# Patient Record
Sex: MALE | Race: WHITE | Employment: OTHER | URBAN - METROPOLITAN AREA
[De-identification: names, ages, dates, MRNs, and addresses within clinical notes are randomized per-mention and may not be internally consistent; named-entity substitution may affect disease eponyms.]

---

## 2019-08-04 ENCOUNTER — APPOINTMENT (OUTPATIENT)
Dept: CT IMAGING | Age: 24
End: 2019-08-04

## 2019-08-04 ENCOUNTER — HOSPITAL ENCOUNTER (EMERGENCY)
Age: 24
Discharge: HOME | End: 2019-08-04
Attending: EMERGENCY MEDICINE

## 2019-08-04 VITALS
DIASTOLIC BLOOD PRESSURE: 103 MMHG | HEART RATE: 96 BPM | TEMPERATURE: 98.7 F | RESPIRATION RATE: 20 BRPM | WEIGHT: 300 LBS | HEIGHT: 72 IN | OXYGEN SATURATION: 99 % | SYSTOLIC BLOOD PRESSURE: 168 MMHG | BODY MASS INDEX: 40.63 KG/M2

## 2019-08-04 DIAGNOSIS — S09.90XA INJURY OF HEAD, INITIAL ENCOUNTER: Primary | ICD-10-CM

## 2019-08-04 PROCEDURE — 6370000000 HC RX 637 (ALT 250 FOR IP): Performed by: STUDENT IN AN ORGANIZED HEALTH CARE EDUCATION/TRAINING PROGRAM

## 2019-08-04 PROCEDURE — 99284 EMERGENCY DEPT VISIT MOD MDM: CPT

## 2019-08-04 PROCEDURE — 70450 CT HEAD/BRAIN W/O DYE: CPT

## 2019-08-04 RX ORDER — ACETAMINOPHEN 500 MG
1000 TABLET ORAL ONCE
Status: COMPLETED | OUTPATIENT
Start: 2019-08-04 | End: 2019-08-04

## 2019-08-04 RX ADMIN — ACETAMINOPHEN 1000 MG: 500 TABLET ORAL at 14:25

## 2019-08-04 ASSESSMENT — PAIN DESCRIPTION - FREQUENCY: FREQUENCY: CONTINUOUS

## 2019-08-04 ASSESSMENT — PAIN SCALES - GENERAL
PAINLEVEL_OUTOF10: 7
PAINLEVEL_OUTOF10: 7

## 2019-08-04 ASSESSMENT — PAIN DESCRIPTION - LOCATION: LOCATION: HEAD

## 2019-08-04 ASSESSMENT — PAIN DESCRIPTION - DESCRIPTORS: DESCRIPTORS: SHARP;THROBBING

## 2019-08-04 ASSESSMENT — ENCOUNTER SYMPTOMS
NAUSEA: 0
VOMITING: 0
SHORTNESS OF BREATH: 0
ABDOMINAL PAIN: 0

## 2020-12-08 ENCOUNTER — OFFICE VISIT (OUTPATIENT)
Dept: PRIMARY CARE CLINIC | Age: 25
End: 2020-12-08

## 2020-12-08 PROCEDURE — 99211 OFF/OP EST MAY X REQ PHY/QHP: CPT | Performed by: NURSE PRACTITIONER

## 2020-12-08 NOTE — PROGRESS NOTES
Jass Santamaria received a viral test for COVID-19. They were educated on isolation and quarantine as appropriate. For any symptoms, they were directed to seek care from their PCP, given contact information to establish with a doctor, directed to an urgent care or the emergency room.

## 2020-12-10 LAB — SARS-COV-2, NAA: NOT DETECTED

## 2021-02-17 ENCOUNTER — APPOINTMENT (OUTPATIENT)
Dept: CT IMAGING | Age: 26
End: 2021-02-17
Payer: COMMERCIAL

## 2021-02-17 ENCOUNTER — APPOINTMENT (OUTPATIENT)
Dept: GENERAL RADIOLOGY | Age: 26
End: 2021-02-17
Payer: COMMERCIAL

## 2021-02-17 ENCOUNTER — HOSPITAL ENCOUNTER (OUTPATIENT)
Age: 26
Setting detail: OBSERVATION
Discharge: HOME | End: 2021-02-18
Attending: INTERNAL MEDICINE | Admitting: INTERNAL MEDICINE
Payer: COMMERCIAL

## 2021-02-17 DIAGNOSIS — R06.02 SHORTNESS OF BREATH: ICD-10-CM

## 2021-02-17 DIAGNOSIS — R42 DIZZINESS: ICD-10-CM

## 2021-02-17 DIAGNOSIS — I48.91 NEW ONSET A-FIB (HCC): Primary | ICD-10-CM

## 2021-02-17 DIAGNOSIS — R94.31 T WAVE INVERSION IN EKG: ICD-10-CM

## 2021-02-17 LAB
ANION GAP SERPL CALCULATED.3IONS-SCNC: 11 MMOL/L (ref 3–16)
APTT: 33.1 SEC (ref 24.2–36.2)
BASOPHILS ABSOLUTE: 0.1 K/UL (ref 0–0.2)
BASOPHILS RELATIVE PERCENT: 1.1 %
BUN BLDV-MCNC: 8 MG/DL (ref 7–20)
CALCIUM SERPL-MCNC: 9.1 MG/DL (ref 8.3–10.6)
CHLORIDE BLD-SCNC: 104 MMOL/L (ref 99–110)
CO2: 26 MMOL/L (ref 21–32)
CREAT SERPL-MCNC: 1 MG/DL (ref 0.9–1.3)
D DIMER: <200 NG/ML DDU (ref 0–229)
EOSINOPHILS ABSOLUTE: 1.4 K/UL (ref 0–0.6)
EOSINOPHILS RELATIVE PERCENT: 13.5 %
GFR AFRICAN AMERICAN: >60
GFR NON-AFRICAN AMERICAN: >60
GLUCOSE BLD-MCNC: 156 MG/DL (ref 70–99)
GLUCOSE BLD-MCNC: 94 MG/DL (ref 70–99)
HCT VFR BLD CALC: 48 % (ref 40.5–52.5)
HEMOGLOBIN: 16.6 G/DL (ref 13.5–17.5)
INR BLD: 0.98 (ref 0.86–1.14)
LYMPHOCYTES ABSOLUTE: 3.7 K/UL (ref 1–5.1)
LYMPHOCYTES RELATIVE PERCENT: 35.9 %
MCH RBC QN AUTO: 31.1 PG (ref 26–34)
MCHC RBC AUTO-ENTMCNC: 34.6 G/DL (ref 31–36)
MCV RBC AUTO: 89.8 FL (ref 80–100)
MONOCYTES ABSOLUTE: 0.5 K/UL (ref 0–1.3)
MONOCYTES RELATIVE PERCENT: 5 %
NEUTROPHILS ABSOLUTE: 4.5 K/UL (ref 1.7–7.7)
NEUTROPHILS RELATIVE PERCENT: 44.5 %
PDW BLD-RTO: 13.3 % (ref 12.4–15.4)
PERFORMED ON: NORMAL
PLATELET # BLD: 291 K/UL (ref 135–450)
PMV BLD AUTO: 7.8 FL (ref 5–10.5)
POTASSIUM REFLEX MAGNESIUM: 3.6 MMOL/L (ref 3.5–5.1)
PRO-BNP: 1813 PG/ML (ref 0–124)
PROTHROMBIN TIME: 11.4 SEC (ref 10–13.2)
RBC # BLD: 5.35 M/UL (ref 4.2–5.9)
SODIUM BLD-SCNC: 141 MMOL/L (ref 136–145)
TROPONIN: <0.01 NG/ML
TSH REFLEX: 3 UIU/ML (ref 0.27–4.2)
WBC # BLD: 10.2 K/UL (ref 4–11)

## 2021-02-17 PROCEDURE — 2500000003 HC RX 250 WO HCPCS: Performed by: NURSE PRACTITIONER

## 2021-02-17 PROCEDURE — 71046 X-RAY EXAM CHEST 2 VIEWS: CPT

## 2021-02-17 PROCEDURE — 96374 THER/PROPH/DIAG INJ IV PUSH: CPT

## 2021-02-17 PROCEDURE — 85610 PROTHROMBIN TIME: CPT

## 2021-02-17 PROCEDURE — 84484 ASSAY OF TROPONIN QUANT: CPT

## 2021-02-17 PROCEDURE — 83880 ASSAY OF NATRIURETIC PEPTIDE: CPT

## 2021-02-17 PROCEDURE — 71045 X-RAY EXAM CHEST 1 VIEW: CPT

## 2021-02-17 PROCEDURE — 80048 BASIC METABOLIC PNL TOTAL CA: CPT

## 2021-02-17 PROCEDURE — 84443 ASSAY THYROID STIM HORMONE: CPT

## 2021-02-17 PROCEDURE — 85025 COMPLETE CBC W/AUTO DIFF WBC: CPT

## 2021-02-17 PROCEDURE — G0378 HOSPITAL OBSERVATION PER HR: HCPCS

## 2021-02-17 PROCEDURE — 85379 FIBRIN DEGRADATION QUANT: CPT

## 2021-02-17 PROCEDURE — 99284 EMERGENCY DEPT VISIT MOD MDM: CPT

## 2021-02-17 PROCEDURE — 6370000000 HC RX 637 (ALT 250 FOR IP): Performed by: NURSE PRACTITIONER

## 2021-02-17 PROCEDURE — 70450 CT HEAD/BRAIN W/O DYE: CPT

## 2021-02-17 PROCEDURE — 85730 THROMBOPLASTIN TIME PARTIAL: CPT

## 2021-02-17 PROCEDURE — 36415 COLL VENOUS BLD VENIPUNCTURE: CPT

## 2021-02-17 PROCEDURE — 93005 ELECTROCARDIOGRAM TRACING: CPT | Performed by: NURSE PRACTITIONER

## 2021-02-17 PROCEDURE — 2580000003 HC RX 258: Performed by: NURSE PRACTITIONER

## 2021-02-17 RX ORDER — MECLIZINE HCL 12.5 MG/1
25 TABLET ORAL ONCE
Status: DISCONTINUED | OUTPATIENT
Start: 2021-02-17 | End: 2021-02-17

## 2021-02-17 RX ORDER — 0.9 % SODIUM CHLORIDE 0.9 %
1000 INTRAVENOUS SOLUTION INTRAVENOUS ONCE
Status: COMPLETED | OUTPATIENT
Start: 2021-02-17 | End: 2021-02-18

## 2021-02-17 RX ORDER — MECLIZINE HCL 12.5 MG/1
25 TABLET ORAL ONCE
Status: COMPLETED | OUTPATIENT
Start: 2021-02-17 | End: 2021-02-17

## 2021-02-17 RX ORDER — DILTIAZEM HYDROCHLORIDE 5 MG/ML
10 INJECTION INTRAVENOUS ONCE
Status: COMPLETED | OUTPATIENT
Start: 2021-02-17 | End: 2021-02-17

## 2021-02-17 RX ADMIN — SODIUM CHLORIDE 1000 ML: 9 INJECTION, SOLUTION INTRAVENOUS at 22:56

## 2021-02-17 RX ADMIN — DILTIAZEM HYDROCHLORIDE 5 MG/HR: 5 INJECTION INTRAVENOUS at 22:56

## 2021-02-17 RX ADMIN — DILTIAZEM HYDROCHLORIDE 10 MG: 5 INJECTION INTRAVENOUS at 22:54

## 2021-02-17 RX ADMIN — MECLIZINE 25 MG: 12.5 TABLET ORAL at 22:56

## 2021-02-18 ENCOUNTER — APPOINTMENT (OUTPATIENT)
Dept: CARDIAC CATH/INVASIVE PROCEDURES | Age: 26
End: 2021-02-18
Payer: COMMERCIAL

## 2021-02-18 VITALS
DIASTOLIC BLOOD PRESSURE: 83 MMHG | BODY MASS INDEX: 38.08 KG/M2 | SYSTOLIC BLOOD PRESSURE: 131 MMHG | OXYGEN SATURATION: 99 % | RESPIRATION RATE: 14 BRPM | WEIGHT: 296.74 LBS | HEIGHT: 74 IN | HEART RATE: 91 BPM | TEMPERATURE: 98.3 F

## 2021-02-18 PROBLEM — I48.91 NEW ONSET A-FIB (HCC): Status: RESOLVED | Noted: 2021-02-17 | Resolved: 2021-02-18

## 2021-02-18 LAB
AMPHETAMINE SCREEN, URINE: NORMAL
ANION GAP SERPL CALCULATED.3IONS-SCNC: 11 MMOL/L (ref 3–16)
BARBITURATE SCREEN URINE: NORMAL
BENZODIAZEPINE SCREEN, URINE: NORMAL
BILIRUBIN URINE: NEGATIVE
BLOOD, URINE: NEGATIVE
BUN BLDV-MCNC: 9 MG/DL (ref 7–20)
CALCIUM SERPL-MCNC: 8.7 MG/DL (ref 8.3–10.6)
CANNABINOID SCREEN URINE: NORMAL
CHLORIDE BLD-SCNC: 106 MMOL/L (ref 99–110)
CLARITY: CLEAR
CO2: 23 MMOL/L (ref 21–32)
COCAINE METABOLITE SCREEN URINE: NORMAL
COLOR: YELLOW
CREAT SERPL-MCNC: 1 MG/DL (ref 0.9–1.3)
EPITHELIAL CELLS, UA: 1 /HPF (ref 0–5)
GFR AFRICAN AMERICAN: >60
GFR NON-AFRICAN AMERICAN: >60
GLUCOSE BLD-MCNC: 103 MG/DL (ref 70–99)
GLUCOSE URINE: NEGATIVE MG/DL
HYALINE CASTS: 5 /LPF (ref 0–8)
KETONES, URINE: ABNORMAL MG/DL
LEUKOCYTE ESTERASE, URINE: ABNORMAL
LV EF: 55 %
LVEF MODALITY: NORMAL
Lab: NORMAL
METHADONE SCREEN, URINE: NORMAL
MICROSCOPIC EXAMINATION: YES
NITRITE, URINE: NEGATIVE
OPIATE SCREEN URINE: NORMAL
OXYCODONE URINE: NORMAL
PH UA: 6.5 (ref 5–8)
PH UA: 7
PHENCYCLIDINE SCREEN URINE: NORMAL
POTASSIUM SERPL-SCNC: 3.7 MMOL/L (ref 3.5–5.1)
PROPOXYPHENE SCREEN: NORMAL
PROTEIN UA: NEGATIVE MG/DL
RBC UA: 0 /HPF (ref 0–4)
SARS-COV-2, NAAT: NOT DETECTED
SODIUM BLD-SCNC: 140 MMOL/L (ref 136–145)
SPECIFIC GRAVITY UA: 1.02 (ref 1–1.03)
TROPONIN: <0.01 NG/ML
TROPONIN: <0.01 NG/ML
URINE REFLEX TO CULTURE: ABNORMAL
URINE TYPE: ABNORMAL
UROBILINOGEN, URINE: 1 E.U./DL
WBC UA: 8 /HPF (ref 0–5)

## 2021-02-18 PROCEDURE — 2500000003 HC RX 250 WO HCPCS

## 2021-02-18 PROCEDURE — 93320 DOPPLER ECHO COMPLETE: CPT | Performed by: FAMILY MEDICINE

## 2021-02-18 PROCEDURE — 99152 MOD SED SAME PHYS/QHP 5/>YRS: CPT | Performed by: INTERNAL MEDICINE

## 2021-02-18 PROCEDURE — 80048 BASIC METABOLIC PNL TOTAL CA: CPT

## 2021-02-18 PROCEDURE — 99244 OFF/OP CNSLTJ NEW/EST MOD 40: CPT | Performed by: NURSE PRACTITIONER

## 2021-02-18 PROCEDURE — 87536 HIV-1 QUANT&REVRSE TRNSCRPJ: CPT

## 2021-02-18 PROCEDURE — 6370000000 HC RX 637 (ALT 250 FOR IP): Performed by: NURSE PRACTITIONER

## 2021-02-18 PROCEDURE — 80307 DRUG TEST PRSMV CHEM ANLYZR: CPT

## 2021-02-18 PROCEDURE — 92960 CARDIOVERSION ELECTRIC EXT: CPT | Performed by: INTERNAL MEDICINE

## 2021-02-18 PROCEDURE — 99152 MOD SED SAME PHYS/QHP 5/>YRS: CPT | Performed by: FAMILY MEDICINE

## 2021-02-18 PROCEDURE — C8929 TTE W OR WO FOL WCON,DOPPLER: HCPCS

## 2021-02-18 PROCEDURE — 36415 COLL VENOUS BLD VENIPUNCTURE: CPT

## 2021-02-18 PROCEDURE — G0378 HOSPITAL OBSERVATION PER HR: HCPCS

## 2021-02-18 PROCEDURE — 93312 ECHO TRANSESOPHAGEAL: CPT | Performed by: FAMILY MEDICINE

## 2021-02-18 PROCEDURE — 6360000002 HC RX W HCPCS

## 2021-02-18 PROCEDURE — 92960 CARDIOVERSION ELECTRIC EXT: CPT | Performed by: FAMILY MEDICINE

## 2021-02-18 PROCEDURE — 93312 ECHO TRANSESOPHAGEAL: CPT | Performed by: INTERNAL MEDICINE

## 2021-02-18 PROCEDURE — 2580000003 HC RX 258

## 2021-02-18 PROCEDURE — 93325 DOPPLER ECHO COLOR FLOW MAPG: CPT | Performed by: FAMILY MEDICINE

## 2021-02-18 PROCEDURE — 6360000002 HC RX W HCPCS: Performed by: NURSE PRACTITIONER

## 2021-02-18 PROCEDURE — 81001 URINALYSIS AUTO W/SCOPE: CPT

## 2021-02-18 PROCEDURE — 84484 ASSAY OF TROPONIN QUANT: CPT

## 2021-02-18 PROCEDURE — 94760 N-INVAS EAR/PLS OXIMETRY 1: CPT

## 2021-02-18 PROCEDURE — 6360000004 HC RX CONTRAST MEDICATION: Performed by: NURSE PRACTITIONER

## 2021-02-18 RX ORDER — ONDANSETRON 2 MG/ML
4 INJECTION INTRAMUSCULAR; INTRAVENOUS EVERY 6 HOURS PRN
Status: DISCONTINUED | OUTPATIENT
Start: 2021-02-18 | End: 2021-02-18 | Stop reason: HOSPADM

## 2021-02-18 RX ORDER — SODIUM CHLORIDE 0.9 % (FLUSH) 0.9 %
10 SYRINGE (ML) INJECTION EVERY 12 HOURS SCHEDULED
Status: DISCONTINUED | OUTPATIENT
Start: 2021-02-18 | End: 2021-02-18 | Stop reason: SDUPTHER

## 2021-02-18 RX ORDER — FLECAINIDE ACETATE 100 MG/1
50 TABLET ORAL EVERY 12 HOURS SCHEDULED
Status: DISCONTINUED | OUTPATIENT
Start: 2021-02-18 | End: 2021-02-18 | Stop reason: HOSPADM

## 2021-02-18 RX ORDER — PROMETHAZINE HYDROCHLORIDE 25 MG/1
12.5 TABLET ORAL EVERY 6 HOURS PRN
Status: DISCONTINUED | OUTPATIENT
Start: 2021-02-18 | End: 2021-02-18 | Stop reason: HOSPADM

## 2021-02-18 RX ORDER — METOPROLOL SUCCINATE 25 MG/1
12.5 TABLET, EXTENDED RELEASE ORAL DAILY
Status: DISCONTINUED | OUTPATIENT
Start: 2021-02-18 | End: 2021-02-18 | Stop reason: HOSPADM

## 2021-02-18 RX ORDER — SODIUM CHLORIDE 0.9 % (FLUSH) 0.9 %
10 SYRINGE (ML) INJECTION EVERY 12 HOURS SCHEDULED
Status: DISCONTINUED | OUTPATIENT
Start: 2021-02-18 | End: 2021-02-18 | Stop reason: HOSPADM

## 2021-02-18 RX ORDER — METOPROLOL SUCCINATE 25 MG/1
12.5 TABLET, EXTENDED RELEASE ORAL DAILY
Qty: 30 TABLET | Refills: 0 | Status: SHIPPED | OUTPATIENT
Start: 2021-02-19 | End: 2021-04-25 | Stop reason: ALTCHOICE

## 2021-02-18 RX ORDER — SODIUM CHLORIDE 0.9 % (FLUSH) 0.9 %
10 SYRINGE (ML) INJECTION PRN
Status: DISCONTINUED | OUTPATIENT
Start: 2021-02-18 | End: 2021-02-18 | Stop reason: HOSPADM

## 2021-02-18 RX ORDER — SODIUM CHLORIDE 0.9 % (FLUSH) 0.9 %
10 SYRINGE (ML) INJECTION PRN
Status: DISCONTINUED | OUTPATIENT
Start: 2021-02-18 | End: 2021-02-18 | Stop reason: SDUPTHER

## 2021-02-18 RX ORDER — ACETAMINOPHEN 650 MG/1
650 SUPPOSITORY RECTAL EVERY 6 HOURS PRN
Status: DISCONTINUED | OUTPATIENT
Start: 2021-02-18 | End: 2021-02-18 | Stop reason: HOSPADM

## 2021-02-18 RX ORDER — ACETAMINOPHEN 325 MG/1
650 TABLET ORAL EVERY 6 HOURS PRN
Status: DISCONTINUED | OUTPATIENT
Start: 2021-02-18 | End: 2021-02-18 | Stop reason: HOSPADM

## 2021-02-18 RX ORDER — POLYETHYLENE GLYCOL 3350 17 G/17G
17 POWDER, FOR SOLUTION ORAL DAILY PRN
Status: DISCONTINUED | OUTPATIENT
Start: 2021-02-18 | End: 2021-02-18 | Stop reason: HOSPADM

## 2021-02-18 RX ORDER — ASPIRIN 81 MG/1
81 TABLET, CHEWABLE ORAL DAILY
Status: DISCONTINUED | OUTPATIENT
Start: 2021-02-18 | End: 2021-02-18 | Stop reason: HOSPADM

## 2021-02-18 RX ORDER — FLECAINIDE ACETATE 50 MG/1
50 TABLET ORAL EVERY 12 HOURS SCHEDULED
Qty: 60 TABLET | Refills: 0 | Status: SHIPPED | OUTPATIENT
Start: 2021-02-18 | End: 2021-08-17 | Stop reason: SDDI

## 2021-02-18 RX ADMIN — METOPROLOL SUCCINATE 12.5 MG: 25 TABLET, EXTENDED RELEASE ORAL at 15:34

## 2021-02-18 RX ADMIN — ACETAMINOPHEN 650 MG: 325 TABLET ORAL at 06:09

## 2021-02-18 RX ADMIN — ENOXAPARIN SODIUM 40 MG: 40 INJECTION SUBCUTANEOUS at 08:20

## 2021-02-18 RX ADMIN — PERFLUTREN 1.5 ML: 6.52 INJECTION, SUSPENSION INTRAVENOUS at 11:13

## 2021-02-18 RX ADMIN — FLECAINIDE ACETATE 50 MG: 100 TABLET ORAL at 19:23

## 2021-02-18 RX ADMIN — ENOXAPARIN SODIUM 40 MG: 40 INJECTION SUBCUTANEOUS at 01:11

## 2021-02-18 RX ADMIN — ASPIRIN 81 MG: 81 TABLET, CHEWABLE ORAL at 08:20

## 2021-02-18 ASSESSMENT — PAIN DESCRIPTION - ONSET: ONSET: AWAKENED FROM SLEEP

## 2021-02-18 ASSESSMENT — PAIN SCALES - GENERAL
PAINLEVEL_OUTOF10: 0
PAINLEVEL_OUTOF10: 0

## 2021-02-18 ASSESSMENT — PAIN DESCRIPTION - ORIENTATION: ORIENTATION: MID

## 2021-02-18 ASSESSMENT — PAIN DESCRIPTION - DESCRIPTORS: DESCRIPTORS: HEADACHE

## 2021-02-18 ASSESSMENT — PAIN - FUNCTIONAL ASSESSMENT: PAIN_FUNCTIONAL_ASSESSMENT: ACTIVITIES ARE NOT PREVENTED

## 2021-02-18 NOTE — PLAN OF CARE
Problem: Falls - Risk of:  Goal: Will remain free from falls  Description: Will remain free from falls  2/18/2021 1240 by Sophia Hill RN  Outcome: Ongoing  2/18/2021 0350 by Manuel Pappas RN  Outcome: Ongoing  Goal: Absence of physical injury  Description: Absence of physical injury  Outcome: Ongoing     Problem: Cardiac:  Goal: Ability to maintain an adequate cardiac output will improve  Description: Ability to maintain an adequate cardiac output will improve  2/18/2021 1240 by Sophia Hill RN  Outcome: Ongoing  2/18/2021 0350 by Manuel Pappas RN  Outcome: Ongoing  Goal: Complications related to the disease process, condition or treatment will be avoided or minimized  Description: Complications related to the disease process, condition or treatment will be avoided or minimized  Outcome: Ongoing  Goal: Hemodynamic stability will improve  Description: Hemodynamic stability will improve  Outcome: Ongoing  Goal: Risk factors for ineffective tissue perfusion will decrease  Description: Risk factors for ineffective tissue perfusion will decrease  Outcome: Ongoing

## 2021-02-18 NOTE — PROGRESS NOTES
4 Eyes Skin Assessment     NAME:  Kendall Lopez  YOB: 1995  MEDICAL RECORD NUMBER:  [id number]    The patient is being assess for  Admission    I agree that 2 RN's have performed a thorough Head to Toe Skin Assessment on the patient. ALL assessment sites listed below have been assessed. Areas assessed by both nurses:    Head, Face, Ears, Shoulders, Back, Chest, Arms, Elbows, Hands, Sacrum. Buttock, Coccyx, Ischium and Legs. Feet and Heels        Does the Patient have a Wound?  No noted wound(s)       Chas Prevention initiated:  No   Wound Care Orders initiated:  No    Pressure Injury (Stage 3,4, Unstageable, DTI, NWPT, and Complex wounds) if present place consult order under [de-identified] No    New and Established Ostomies if present place consult order under : No      Nurse 1 eSignature: Electronically signed by Francois Byrne RN on 2/18/21 at 1:23 AM EST    **SHARE this note so that the co-signing nurse is able to place an eSignature**    Nurse 2 eSignature: Electronically signed by Karthikeyan Hodge RN on 2/18/21 at 1:24 AM EST

## 2021-02-18 NOTE — CONSULTS
Cardiac Electrophysiology Consultation     Date: 2/18/2021  Admit Date:  2/17/2021  Admission Diagnosis: New onset a-fib [facility]) [I48.91]     Reason for Consultation: new atrial fibrillation with RVR  Consult Requesting Physician: Mackenzie Siddiqi MD       History of Present Illness  Felicia Vásquez is a 26y.o. year old male with no known past medical history who presented to the ED for SOB and dizziness. He was seen by his PCP earlier yesterday and said they did an EKG which he was told was \"abnormal\" and that he needed to see a cardiologist but they did not tell him what the EKG showed. He has noticed increased SOB over the last few days but this worsened to the point yesterday to where he was SOB even at rest which is why he came into the ED for evaluation. He has also noticed feeling lightheaded at times. 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 Denies any syncope. No chest pain. He did note some palpitations like his heart was racing. Of note, he did have Covid at the end of last year. Past Medical History:   Diagnosis Date    Atrial fibrillation [facility])         History reviewed. No pertinent surgical history. No current outpatient medications     No Known Allergies    Social History:   reports that he has never smoked. He has never used smokeless tobacco. He reports that he does not drink alcohol or use drugs. Family History:  family history is not on file. Review of Systems:  · General: negative for fever, chills   · Ophthalmic ROS: negative for eye pain or loss of vision  · ENT ROS: negative for headaches, sore throat, nasal drainage  · Respiratory: positive for SOB, negative for cough, sputum. · Cardiovascular: positive for palpitations, negative for chest pain.   · Gastrointestinal: negative for abdominal pain, diarrhea, N/V  · Hematology: negative for bleeding, blood clots, bruising or jaundice  · Genito-Urinary:  negative for

## 2021-02-18 NOTE — PROGRESS NOTES
Pharmacy Medication Reconciliation Note     List of medications patient is currently taking is complete. Source of information:   1. Spoke to pt / spouse     Notes regarding home medications:   1. Spouse reports pt take no medications at home. MD called in Toprolol Xl 25 mg BID today 02/17/21--pt has not picked up nor taken  2.  Pt did have 2 steroid shots into back around January 2021     Spouse denies pt is taking any other OTC or herbal medications    Hoda Devi, Pharmacy Intern  2/17/2021  10:01 PM

## 2021-02-18 NOTE — PRE SEDATION
Sedation Pre-Procedure Note    Patient Name: Rosalva Leigh   YOB: 1995  Room/Bed: Q4R-0210/5262-01  Medical Record Number: [id number]  Date: 2/18/2021   Time: 12:30 PM       Indication:  New-onset atrial fibrillation    Consent: I have discussed with the patient and/or the patient representative the indication, alternatives, and the possible risks and/or complications of the planned procedure and the anesthesia methods. The patient and/or patient representative appear to understand and agree to proceed. Vital Signs:   Vitals:    02/18/21 0736   BP: (!) 159/87   Pulse: 97   Resp: 18   Temp: 97.6 °F (36.4 °C)   SpO2: 96%       Past Medical History:   has a past medical history of Atrial fibrillation ([facility] [address] Past Surgical History:   has no past surgical history on file. Medications:   Scheduled Meds:    sodium chloride flush  10 mL Intravenous 2 times per day    enoxaparin  40 mg Subcutaneous BID    aspirin  81 mg Oral Daily     Continuous Infusions:    dilTIAZem (CARDIZEM) 125 mg in dextrose 5% 125 mL infusion 5 mg/hr (02/17/21 3166)     PRN Meds: sodium chloride flush, promethazine **OR** ondansetron, polyethylene glycol, acetaminophen **OR** acetaminophen  Home Meds:   Prior to Admission medications    Not on File     Coumadin Use Last 7 Days:  no  Antiplatelet drug therapy use last 7 days: yes - ASA  Other anticoagulant use last 7 days: no  Additional Medication Information:  n/a      Pre-Sedation Documentation and Exam:   I have personally completed a history, physical exam & review of systems for this patient (see notes).     Mallampati Airway Assessment:  Mallampati Class I - (soft palate, fauces, uvula & anterior/posterior tonsillar pillars are visible)    Prior History of Anesthesia Complications:   none    ASA Classification:  Class 2 - A normal healthy patient with mild systemic disease    Sedation/ Anesthesia Plan:   intravenous sedation    Medications Planned:   midazolam (Versed)

## 2021-02-18 NOTE — ED NOTES
Handoff report given to [facility] MENDOZA RN to assume care. No further questions at this time.       Froy Ware RN  02/17/21 0731

## 2021-02-18 NOTE — ED PROVIDER NOTES
[address]  [address] [address]  Dept: [phone]  Loc: [address] ENCOUNTER        This patient was not seen or evaluated by the attending physician. I evaluated this patient, the attending physician was available for consultation. CHIEF COMPLAINT    Chief Complaint   Patient presents with    Dizziness     shortness of breath. onset 3 days ago. pt seen at PCP threee days ago       HPI    Rosalva Leigh is a 26 y.o. male who presents with shortness of breath and dizziness. The onset was 3 days ago. He describes the dizziness as the room is spinning. Shortness of breath worsens with exertion. No chest pain. The duration has been intermittent since the onset. The context is that the symptoms started spontaneously. The patient denies any cough, nasal congestion, postnasal drip. Patient denies any cardiac history, A. fib history. Came to the ED for further evaluation and treatment. REVIEW OF SYSTEMS    Cardiac: see HPI, no syncope  Respiratory: + shortness of breath, no cough, no hemoptysis  GI: No vomiting or diarrhea  : No dysuria or hematuria  General: No fever or chills  All other systems reviewed and are negative. PAST MEDICAL & SURGICAL HISTORY    History reviewed. No pertinent past medical history. History reviewed. No pertinent surgical history.     CURRENT MEDICATIONS  (may include discharge medications prescribed in the ED)      ALLERGIES    No Known Allergies    SOCIAL & FAMILY HISTORY    Social History     Socioeconomic History    Marital status: Single     Spouse name: None    Number of children: None    Years of education: None    Highest education level: None   Occupational History    None   Social Needs    Financial resource strain: None    Food insecurity     Worry: None     Inability: None    Transportation needs     Medical: None     Non-medical: None Tobacco Use    Smoking status: Never Smoker    Smokeless tobacco: Never Used   Substance and Sexual Activity    Alcohol use: No    Drug use: No    Sexual activity: Yes     Partners: Female   Lifestyle    Physical activity     Days per week: None     Minutes per session: None    Stress: None   Relationships    Social connections     Talks on phone: None     Gets together: None     Attends Caoda[employer] service: None     Active member of club or organization: None     Attends meetings of clubs or organizations: None     Relationship status: None    Intimate partner violence     Fear of current or ex partner: None     Emotionally abused: None     Physically abused: None     Forced sexual activity: None   Other Topics Concern    None   Social History Narrative    None     History reviewed. No pertinent family history. PHYSICAL EXAM    VITAL SIGNS: BP (!) 142/86   Pulse 80   Temp 97.5 °F (36.4 °C) (Tympanic)   Resp 14   Ht 6' 2\" (1.88 m)   Wt 299 lb 2.6 oz (135.7 kg)   SpO2 99%   BMI 38.41 kg/m²    Constitutional:  Well developed, well nourished, no acute distress   HENT:  Atraumatic, moist mucus membranes  Neck: supple, no JVD   Respiratory:  Lungs clear to auscultation bilaterally, no retractions   Cardiovascular: Irregular and tachycardic rate, no murmurs  Vascular: Radial and DP pulses 2+ and equal bilaterally  GI:  Soft, nontender, normal bowel sounds  Musculoskeletal:  no lower extremity edema, no lower extremity asymmetry, no calf tenderness, no thigh tenderness, no acute deformities  Integument:  Skin warm and dry, no petechiae   Neurologic:  Alert & oriented, no slurred speech  Psych: Pleasant affect, no hallucinations    EKG    Please see the physician note for EKG interpretation.     LABS  Results for orders placed or performed during the hospital encounter of 02/17/21   CBC Auto Differential   Result Value Ref Range    WBC 10.2 4.0 - 11.0 K/uL    RBC 5.35 4.20 - 5.90 M/uL    Hemoglobin 16.6 13.5 - 17.5 g/dL    Hematocrit 48.0 40.5 - 52.5 %    MCV 89.8 80.0 - 100.0 fL    MCH 31.1 26.0 - 34.0 pg    MCHC 34.6 31.0 - 36.0 g/dL    RDW 13.3 12.4 - 15.4 %    Platelets 013 401 - 775 K/uL    MPV 7.8 5.0 - 10.5 fL    Neutrophils % 44.5 %    Lymphocytes % 35.9 %    Monocytes % 5.0 %    Eosinophils % 13.5 %    Basophils % 1.1 %    Neutrophils Absolute 4.5 1.7 - 7.7 K/uL    Lymphocytes Absolute 3.7 1.0 - 5.1 K/uL    Monocytes Absolute 0.5 0.0 - 1.3 K/uL    Eosinophils Absolute 1.4 (H) 0.0 - 0.6 K/uL    Basophils Absolute 0.1 0.0 - 0.2 K/uL   Basic Metabolic Panel w/ Reflex to MG   Result Value Ref Range    Sodium 141 136 - 145 mmol/L    Potassium reflex Magnesium 3.6 3.5 - 5.1 mmol/L    Chloride 104 99 - 110 mmol/L    CO2 26 21 - 32 mmol/L    Anion Gap 11 3 - 16    Glucose 156 (H) 70 - 99 mg/dL    BUN 8 7 - 20 mg/dL    CREATININE 1.0 0.9 - 1.3 mg/dL    GFR Non-African American >60 >60    GFR African American >60 >60    Calcium 9.1 8.3 - 10.6 mg/dL   Troponin   Result Value Ref Range    Troponin <0.01 <0.01 ng/mL   D-Dimer, Quantitative   Result Value Ref Range    D-Dimer, Quant <200 0 - 229 ng/mL DDU   Brain Natriuretic Peptide   Result Value Ref Range    Pro-BNP 1,813 (H) 0 - 124 pg/mL   APTT   Result Value Ref Range    aPTT 33.1 24.2 - 36.2 sec   Protime-INR   Result Value Ref Range    Protime 11.4 10.0 - 13.2 sec    INR 0.98 0.86 - 1.14   POCT Glucose   Result Value Ref Range    POC Glucose 94 70 - 99 mg/dl    Performed on [facility]          RADIOLOGY/PROCEDURES    CT Head WO Contrast   Final Result   No acute intracranial abnormality. XR CHEST (2 VW)   Final Result   No acute process. ED COURSE & MEDICAL DECISION MAKING    Pertinent Labs & Imaging studies reviewed and interpreted. (See chart for details)  The patient was immediately placed on the cardiac monitor. IV access obtained. See chart for details of medications given during the ED stay.     Vitals:    02/17/21 2045 02/17/21 2053 02/17/21 2145 02/17/21 2300   BP:  (!) 139/91 99/81 (!) 142/86   Pulse:  99 110 80   Resp:  18 17 14   Temp:  97.5 °F (36.4 °C)     TempSrc:  Tympanic     SpO2:  97% 99% 99%   Weight: 299 lb 2.6 oz (135.7 kg)      Height: 6' 2\" (1.88 m)          Differential Diagnosis: Acute Coronary Syndrome, Congestive Heart Failure, Thoracic Dissection, Pericarditis, Pericardial Effusion, Pulmonary Embolism, Pneumonia, Pneumothorax, Ischemic Bowel, Bowel Obstruction, PUD, GERD, Acute Cholecystitis, Pancreatitis, Hepatitis, Colitis, other    CRITICAL CARE NOTE:  There was a high probability of clinically significant life-threatening deterioration of the patient's condition requiring my urgent intervention. Total critical care time was at least 30 minutes. This includes vital sign monitoring, pulse oximetry monitoring, telemetry monitoring, clinical response to the IV medications, reviewing the nursing notes, consultation time, dictation/documentation time, and interpretation of the labwork. This excludes any separately billable procedures performed. Patient is afebrile and nontoxic in appearance. Patient is neurologically intact with no focal deficits, numbness or tingling or truncal ataxia. His dizziness is described as if the room is spinning. Patient's EKG was officially interpreted per my attending, however he does have A. fib with RVR which is a new finding for the patient. He has marked T wave inversions. Labs reveal no leukocytosis or anemia. Metabolic panel unremarkable. CXR findings as above. EKG interpreted by physician. Troponin negative. ddimer negative. Consultation with hospitalist: I contacted Dr. Amita Schreiber via [employer], and the patient was accepted for admission. FINAL IMPRESSION    1. New onset a-fib ([address]    2. Dizziness    3. Shortness of breath    4.  T wave inversion in EKG        PLAN  Admission to the hospital    (Please note that this note was completed with a voice recognition program.  Every attempt was made to edit the dictations, but inevitably there remain words that are mis-transcribed.)        DEANGELO Harper - CNP  02/17/21 700 DEANGELO Frey - LEW  02/17/21 1733

## 2021-02-18 NOTE — CONSULTS
The NP's [facility], EP NP) documentation has been prepared under my direction and personally reviewed by me in its entirety. I confirm that the consultation note created by the NP accurately reflects all work, physical examination, the discussion of treatments and procedures, and medical decision making by the NP. In addition, I have personally met with; performed a physical examination on; discussed the diagnosis (-es), treatment options including procedures, and formulated medical decisions for this patient. In brief, 18yo male without cardiac history and no IVDU who presents with dyspnea with exertion and found to be in new-onset atrial fibrillation. Will plan for JOHN/CV to restore sinus rhythm, followed by Flecainide 50mg BID, Toprol XL 12.5mg daily. Not indicated to be on either ASA nor OAC given [id number] score of 0. Please refer to the NP's consult note for full details on the assessment and plan. Thank you for allowing us to participate in the care of your patient. If you have any questions, please do not hesitate to contact us.      Giovanny Perry MD, MS, [facility]  Cardiac Electrophysiology  [address]  [address] [facility], [address] [place] [address]  Jaleel Bell [facility] [address]  [phone]

## 2021-02-18 NOTE — PROCEDURES
[employer]     Electrophysiology Procedure Note       Date of Procedure: 2/18/2021  Patient's Name: Alana Parnell  YOB: 1995   Medical Record Number: [id number]  Referring Physician: Jerry Diego MD  Procedure Performed by: Bruce Gutierrez MD    Procedures performed:  · Anesthesia: Monitored Anesthesia Care  · Level of sedation plan: Moderate sedation (conscious sedation) with intravenous Midazolam 4 mg, Fentanyl 75 mcg, and Methohexital 30 mg  · Sedation start time: 1238  · Sedation stop time: 1246  · Mallampati airway assessment class: I  · ASA class: 1  · (there were no independent trained observers who had no other duties involved in this procedure)  · Trans-esophageal echocardiography  · External Electrical cardioversion     Indication of the procedure: Symptomatic, new-onset atrial fibrillation      Details of procedure: The patient was brought to the cath lab area in a fasting and non-sedated state. The risks, benefits and alternatives of the procedure were discussed with the patient. The risks including, but not limited to, the risks of vascular injury, bleeding, infection, any pre-existing cardiac implantable electronic device malfunction, any pre-existing cardiac implantable electronic device's lead dislodgement, injury to cardiac and surrounding structures (including pneumothorax), stroke, myocardial infarction and death were discussed in detail. The patient was also counseled at length about the risks of lesvia Covid-19 in the mark-operative and post-operative states including the recovery window of their procedure. The patient was made aware that lesvia Covid-19 after a surgical procedure may worsen their prognosis for recovering from the virus and lend to a higher morbidity and or mortality risk. The patient was given the option of postponing their procedure. The patient opted to proceed with the procedure. Written informed consent was signed and placed in the chart. A timeout protocol was completed to identify the patient and the procedure being performed. An independent trained observer assumed the sole responsibility of administering IV sedation medication - Versed, Fentanyl - at my direction and closely monitored the patient. A JOHN was performed which did not show any LOLLY/LA clot/thrombus. Full JOHN reports will be dictated. Given [id number] score ofr 0, he is not indicated for either ASA nor OAC. The patient was monitored continuously with ECG, pulse oximetry, blood pressure monitoring, and direct observation. We then administered intravenous Methohexital for sedation, and electrical DC cardioversion was performed using 200J, synchronized shock. Patient was converted to sinus rhythm. Specimen collected: none       The patient tolerated the procedure well and there were no complications. Conclusion:   Successful external DC cardioversion of symptomatic, new onset atrial fibrillation. Plan:   The patient can be discharged if remains stable. Will start Flecainide 50mg BID, toprol XL 12.5mg daily. Given [id number] score of 0, he is not indicated for [address] [place] [address] nor ASA. The patient will follow-up with Dr. Janet Tyler as an outpatient to discuss other therapeutic options for atrial fibrillation, namely atrial fibrillation ablation. Thank you for allowing me to participate in the care of this patient. If you have any questions, please feel free to contact me.     Arsen Barrera MD, MS, [facility], [facility]  Cardiac Electrophysiology  [address]  [address] [facility], [address] [facility]  Jaleel Martinez [address]  [phone]

## 2021-02-18 NOTE — PROGRESS NOTES
Patient's HR has maintained between  throughout the night with occasional short bursts where his heart rate jumps to 140's and shortly after drops back to a controlled rate. Will continue to monitor.     Electronically signed by Francisca Ruano RN on 2/18/2021 at 6:42 AM

## 2021-02-18 NOTE — H&P
Hospital Medicine History & Physical      PCP: Jaret Pires MD    Date of Admission: 2/17/2021    Date of Service: Pt seen/examined on 2/17/2021 and Placed in Observation. Chief Complaint:  Dizziness and shortness of breath      History Of Present Illness:      26 y.o. male with no significant PMHx presented to [facility] with complaints of shortness of breath which began 3 days ago. He states initially his shortness of breath was intermittent but has gradually worsened over the last 3 days and is now constant. Last night he became dizzy. He described this as a room spinning. This has continued through today. He also complains of palpitations. He can feel his heart racing. He denies any chest pain or pressure. In the emergency department he was found to be in atrial fibrillation with a rapid ventricular response. Patient reports that he went to his PCP 3 days ago and had an EKG obtained at that time and also was in A. fib. He was going to follow-up with cardiology but symptoms became too unbearable so presented to ED tonight. Patient denies cough or congestion. No fever or chills. No nausea, vomiting or diarrhea. Patient was treated for COVID-19 December 26. Past Medical History:      History reviewed. No pertinent past medical history. Past Surgical History:      History reviewed. No pertinent surgical history. Medications Prior to Admission:      No home medication    Allergies:  Patient has no known allergies. Social History:      The patient currently lives at home with spouse    TOBACCO:   reports that he has never smoked. He has never used smokeless tobacco.  ETOH:   reports no history of alcohol use. Family History:      Reviewed in detail positive as follows:    History reviewed. No pertinent family history. REVIEW OF SYSTEMS:   Pertinent positives as noted in the HPI. All other systems reviewed and negative.     PHYSICAL EXAM PERFORMED:    BP (!) 142/86 Pulse 80   Temp 97.6 °F (36.4 °C)   Resp 14   Ht 6' 2\" (1.88 m)   Wt 299 lb 2.6 oz (135.7 kg)   SpO2 99%   BMI 38.41 kg/m²     General appearance: Well developed, well-nourished  sitting upright on ED cart in male no apparent distress, appears stated age and cooperative. HEENT:  Normal cephalic, atraumatic without obvious deformity. Pupils equal, round, and reactive to light. Extra ocular muscles intact. Conjunctivae/corneas clear. Neck: Supple, with full range of motion. No jugular venous distention. Trachea midline. Respiratory:  Normal respiratory effort. Clear to auscultation, bilaterally without Rales/Wheezes/Rhonchi. No accessory muscle use. Cardiovascular: Irregular irregular rhythm without murmurs, rubs or gallops. No lower extremity edema. Abdomen: Soft, non-tender, non-distended, without rebound or guarding. Normal bowel sounds. Musculoskeletal:  No clubbing, cyanosis or edema bilaterally. Full range of motion without deformity. Skin: Skin color, texture, turgor normal.  No rashes or lesions. Neurologic:  Neurovascularly intact without any focal sensory/motor deficits. Cranial nerves: II-XII intact, grossly non-focal.  Psychiatric:  Alert and oriented, thought content appropriate, normal insight  Capillary Refill: Brisk,< 3 seconds   Peripheral Pulses: +2 palpable, equal bilaterally       Labs:     Recent Labs     02/17/21 2111   WBC 10.2   HGB 16.6   HCT 48.0        Recent Labs     02/17/21 2111      K 3.6      CO2 26   BUN 8   CREATININE 1.0   CALCIUM 9.1     No results for input(s): AST, ALT, BILIDIR, BILITOT, ALKPHOS in the last 72 hours.   Recent Labs     02/17/21 2111   INR 0.98     Recent Labs     02/17/21 2111   TROPONINI <0.01       Urinalysis:      Lab Results   Component Value Date    NITRU Negative 02/18/2021    WBCUA 1 11/28/2017    RBCUA 0 11/28/2017    BLOODU Negative 02/18/2021    SPECGRAV 1.022 02/18/2021    GLUCOSEU Negative 02/18/2021 Radiology:     EKG:  Per EDMD review: A. fib with RVR. There are T wave inversion in anteriorolateral leads    CT Head WO Contrast   Final Result   No acute intracranial abnormality. XR CHEST (2 VW)   Final Result   No acute process. ASSESSMENT:    Active Hospital Problems    Diagnosis Date Noted    New onset a-fib [facility]) [I48.91] 02/17/2021         PLAN:    New Onset Atrial fibrillation with RVR   - Pt was started on a Cardizem gtt which will be titrated to a HR of < 100 BPM  - cardizem bolus of 10 mg given  - check TSH/FT4  - monitor on telemetry  - Chads-Vasc2 score 0 - no need for anticoagulation  - Cardiology has been consulted  - EKG: T wave inversion  - repeat EKG if chest pian  - trend trop    DVT Prophylaxis: Lovenox  Diet: Diet NPO Effective Now  Code Status: No Order    Dispo - Observation       CARISSA CONNOLLY, DEANGELO - CNP    Thank you Liza Galloway MD for the opportunity to be involved in this patient's care. If you have any questions or concerns please feel free to contact me at [phone].

## 2021-02-18 NOTE — ED NOTES
2 attempts to place IV; both unsuccessful. Asked Marc RN to attempt placement.       Claudio Hendrickson RN  02/17/21 [id number]

## 2021-02-18 NOTE — DISCHARGE SUMMARY
Hospital Medicine Discharge Summary    Patient ID: Bryant Teixeira      Patient's PCP: Pepe Raymond MD    Admit Date: 2/17/2021     Discharge Date:   2/18/21    Admitting Physician: Veena Negron MD     Discharge Physician: Stacy Ford MD     Discharge Diagnoses:  New onset atrial fibrillation      There are no active hospital problems to display for this patient. The patient was seen and examined on day of discharge and this discharge summary is in conjunction with any daily progress note from day of discharge. Hospital Course:   26 y.o. male with no significant PMHx presented to [facility] with complaints of shortness of breath which began 3 days ago. He states initially his shortness of breath was intermittent but has gradually worsened over the last 3 days and is now constant. Last night he became dizzy. He described this as a room spinning. This has continued through today. He also complains of palpitations. He can feel his heart racing. denies chest pain or pressure. In the emergency department found to be in atrial fibrillation with a rapid ventricular response. Patient reports that he went to his PCP 3 days ago and had an EKG obtained at that time and also was in A. fib. He was going to follow-up with cardiology but symptoms became too [facility]able [facility]       Patient denies cough or congestion. No fever or chills. No nausea, vomiting or diarrhea. Patient was treated for COVID-19 December 26. Seen by Dr Wil Valenzuela EPS specialist underwent cardioversion. Started on toprol XL 12.5mg daily and flecainaide 50mg po BID. Chadsvasc low so no need aspirin or OAC. FU Dr Master Fontenot . TSH and utox normal          Physical Exam Performed:     /83   Pulse 91   Temp 98.3 °F (36.8 °C) (Oral)   Resp 14   Ht 6' 2\" (1.88 m)   Wt 296 lb 11.8 oz (134.6 kg)   SpO2 99%   BMI 38.10 kg/m²       General appearance:  No distress, appears stated age and cooperative. overweight large stature. Dozing off during instrx   HEENT:  Normal cephalic, atraumatic without obvious deformity. Pupils equal, round, and reactive to light. Extra ocular muscles intact. Conjunctivae/corneas clear. beard   Neck: Supple, with full range of motion. No jugular venous distention. Trachea midline. Respiratory:  Normal effort. Clear to auscultation, bilaterally   Cardiovascular:  Regular rate and rhythm with normal S1/S2 without murmurs, rubs or gallops. Abdomen: Soft, non-tender, non-distended with normal bowel sounds. Musculoskeletal:  No clubbing, cyanosis or edema bilaterally. Full range of motion without deformity. Skin: Skin color, texture, turgor normal.  No rashes or lesions. Neurologic:  Neurovascularly intact without any focal sensory/motor deficits. Cranial nerves: II-XII intact, grossly non-focal.  Psychiatric:  Alert and oriented, normal insight falling asleep   Capillary Refill: Brisk,< 3 seconds   Peripheral Pulses: +2 palpable, equal bilaterally       Labs: For convenience and continuity at follow-up the following most recent labs are provided:      CBC:    Lab Results   Component Value Date    WBC 10.2 02/17/2021    HGB 16.6 02/17/2021    HCT 48.0 02/17/2021     02/17/2021       Renal:    Lab Results   Component Value Date     02/18/2021    K 3.7 02/18/2021    K 3.6 02/17/2021     02/18/2021    CO2 23 02/18/2021    BUN 9 02/18/2021    CREATININE 1.0 02/18/2021    CALCIUM 8.7 02/18/2021         Significant Diagnostic Studies    Radiology:   CT Head WO Contrast   Final Result   No acute intracranial abnormality. XR CHEST (2 VW)   Final Result   No acute process.              Consults:     IP CONSULT TO CARDIOLOGY    Disposition:  Home    Condition at Discharge: Stable    Discharge Instructions/Follow-up:    Code Status:  Full Code     Activity: activity as tolerated    Diet: cardiac diet      Discharge Medications:     Current Discharge Medication List           Details

## 2021-02-19 ENCOUNTER — APPOINTMENT (OUTPATIENT)
Dept: GENERAL RADIOLOGY | Age: 26
End: 2021-02-19
Payer: COMMERCIAL

## 2021-02-19 ENCOUNTER — HOSPITAL ENCOUNTER (EMERGENCY)
Age: 26
Discharge: HOME | End: 2021-02-19
Attending: EMERGENCY MEDICINE
Payer: COMMERCIAL

## 2021-02-19 ENCOUNTER — APPOINTMENT (OUTPATIENT)
Dept: CT IMAGING | Age: 26
End: 2021-02-19
Payer: COMMERCIAL

## 2021-02-19 VITALS
SYSTOLIC BLOOD PRESSURE: 114 MMHG | WEIGHT: 302.47 LBS | OXYGEN SATURATION: 98 % | TEMPERATURE: 98.3 F | HEIGHT: 74 IN | HEART RATE: 98 BPM | RESPIRATION RATE: 20 BRPM | DIASTOLIC BLOOD PRESSURE: 70 MMHG | BODY MASS INDEX: 38.82 KG/M2

## 2021-02-19 DIAGNOSIS — R06.02 SHORTNESS OF BREATH: Primary | ICD-10-CM

## 2021-02-19 LAB
A/G RATIO: 1.6 (ref 1.1–2.2)
ALBUMIN SERPL-MCNC: 4.1 G/DL (ref 3.4–5)
ALP BLD-CCNC: 58 U/L (ref 40–129)
ALT SERPL-CCNC: 46 U/L (ref 10–40)
ANION GAP SERPL CALCULATED.3IONS-SCNC: 8 MMOL/L (ref 3–16)
AST SERPL-CCNC: 24 U/L (ref 15–37)
BASOPHILS ABSOLUTE: 0.1 K/UL (ref 0–0.2)
BASOPHILS RELATIVE PERCENT: 1.4 %
BILIRUB SERPL-MCNC: 0.3 MG/DL (ref 0–1)
BUN BLDV-MCNC: 11 MG/DL (ref 7–20)
CALCIUM SERPL-MCNC: 9.3 MG/DL (ref 8.3–10.6)
CHLORIDE BLD-SCNC: 102 MMOL/L (ref 99–110)
CO2: 27 MMOL/L (ref 21–32)
CREAT SERPL-MCNC: 0.9 MG/DL (ref 0.9–1.3)
D DIMER: <200 NG/ML DDU (ref 0–229)
EKG ATRIAL RATE: 144 BPM
EKG ATRIAL RATE: 94 BPM
EKG DIAGNOSIS: NORMAL
EKG DIAGNOSIS: NORMAL
EKG P AXIS: 31 DEGREES
EKG P-R INTERVAL: 150 MS
EKG Q-T INTERVAL: 352 MS
EKG Q-T INTERVAL: 396 MS
EKG QRS DURATION: 94 MS
EKG QRS DURATION: 96 MS
EKG QTC CALCULATION (BAZETT): 480 MS
EKG QTC CALCULATION (BAZETT): 495 MS
EKG R AXIS: 21 DEGREES
EKG R AXIS: 43 DEGREES
EKG T AXIS: 160 DEGREES
EKG T AXIS: 185 DEGREES
EKG VENTRICULAR RATE: 112 BPM
EKG VENTRICULAR RATE: 94 BPM
EOSINOPHILS ABSOLUTE: 1.1 K/UL (ref 0–0.6)
EOSINOPHILS RELATIVE PERCENT: 13.5 %
GFR AFRICAN AMERICAN: >60
GFR NON-AFRICAN AMERICAN: >60
GLOBULIN: 2.6 G/DL
GLUCOSE BLD-MCNC: 100 MG/DL (ref 70–99)
HCT VFR BLD CALC: 45.9 % (ref 40.5–52.5)
HEMOGLOBIN: 16.1 G/DL (ref 13.5–17.5)
LYMPHOCYTES ABSOLUTE: 3.1 K/UL (ref 1–5.1)
LYMPHOCYTES RELATIVE PERCENT: 38.6 %
MAGNESIUM: 2.1 MG/DL (ref 1.8–2.4)
MCH RBC QN AUTO: 31.4 PG (ref 26–34)
MCHC RBC AUTO-ENTMCNC: 35 G/DL (ref 31–36)
MCV RBC AUTO: 89.6 FL (ref 80–100)
MONOCYTES ABSOLUTE: 0.6 K/UL (ref 0–1.3)
MONOCYTES RELATIVE PERCENT: 7.7 %
NEUTROPHILS ABSOLUTE: 3.1 K/UL (ref 1.7–7.7)
NEUTROPHILS RELATIVE PERCENT: 38.8 %
PDW BLD-RTO: 13.2 % (ref 12.4–15.4)
PLATELET # BLD: 277 K/UL (ref 135–450)
PMV BLD AUTO: 7.8 FL (ref 5–10.5)
POTASSIUM REFLEX MAGNESIUM: 3.5 MMOL/L (ref 3.5–5.1)
PRO-BNP: 129 PG/ML (ref 0–124)
RBC # BLD: 5.12 M/UL (ref 4.2–5.9)
SODIUM BLD-SCNC: 137 MMOL/L (ref 136–145)
TOTAL PROTEIN: 6.7 G/DL (ref 6.4–8.2)
TROPONIN: <0.01 NG/ML
WBC # BLD: 8 K/UL (ref 4–11)

## 2021-02-19 PROCEDURE — 84484 ASSAY OF TROPONIN QUANT: CPT

## 2021-02-19 PROCEDURE — 94640 AIRWAY INHALATION TREATMENT: CPT

## 2021-02-19 PROCEDURE — 83880 ASSAY OF NATRIURETIC PEPTIDE: CPT

## 2021-02-19 PROCEDURE — 80053 COMPREHEN METABOLIC PANEL: CPT

## 2021-02-19 PROCEDURE — 71045 X-RAY EXAM CHEST 1 VIEW: CPT

## 2021-02-19 PROCEDURE — 6360000004 HC RX CONTRAST MEDICATION: Performed by: NURSE PRACTITIONER

## 2021-02-19 PROCEDURE — 36415 COLL VENOUS BLD VENIPUNCTURE: CPT

## 2021-02-19 PROCEDURE — 93005 ELECTROCARDIOGRAM TRACING: CPT | Performed by: NURSE PRACTITIONER

## 2021-02-19 PROCEDURE — 99283 EMERGENCY DEPT VISIT LOW MDM: CPT

## 2021-02-19 PROCEDURE — 71260 CT THORAX DX C+: CPT

## 2021-02-19 PROCEDURE — 93010 ELECTROCARDIOGRAM REPORT: CPT | Performed by: INTERNAL MEDICINE

## 2021-02-19 PROCEDURE — 85379 FIBRIN DEGRADATION QUANT: CPT

## 2021-02-19 PROCEDURE — 83735 ASSAY OF MAGNESIUM: CPT

## 2021-02-19 PROCEDURE — 85025 COMPLETE CBC W/AUTO DIFF WBC: CPT

## 2021-02-19 PROCEDURE — 6370000000 HC RX 637 (ALT 250 FOR IP): Performed by: EMERGENCY MEDICINE

## 2021-02-19 RX ORDER — IPRATROPIUM BROMIDE AND ALBUTEROL SULFATE 2.5; .5 MG/3ML; MG/3ML
1 SOLUTION RESPIRATORY (INHALATION) ONCE
Status: COMPLETED | OUTPATIENT
Start: 2021-02-19 | End: 2021-02-19

## 2021-02-19 RX ADMIN — IPRATROPIUM BROMIDE AND ALBUTEROL SULFATE 1 AMPULE: .5; 3 SOLUTION RESPIRATORY (INHALATION) at 23:12

## 2021-02-19 RX ADMIN — IOPAMIDOL 75 ML: 755 INJECTION, SOLUTION INTRAVENOUS at 21:58

## 2021-02-19 ASSESSMENT — ENCOUNTER SYMPTOMS
NAUSEA: 0
ABDOMINAL PAIN: 0
COLOR CHANGE: 0
SHORTNESS OF BREATH: 1
DIARRHEA: 0
COUGH: 0
VOMITING: 0
ABDOMINAL DISTENTION: 0
BACK PAIN: 0

## 2021-02-20 LAB
EKG ATRIAL RATE: 81 BPM
EKG DIAGNOSIS: NORMAL
EKG P AXIS: 47 DEGREES
EKG P-R INTERVAL: 148 MS
EKG Q-T INTERVAL: 388 MS
EKG QRS DURATION: 96 MS
EKG QTC CALCULATION (BAZETT): 450 MS
EKG R AXIS: 33 DEGREES
EKG T AXIS: 169 DEGREES
EKG VENTRICULAR RATE: 81 BPM

## 2021-02-20 PROCEDURE — 93010 ELECTROCARDIOGRAM REPORT: CPT | Performed by: INTERNAL MEDICINE

## 2021-02-20 ASSESSMENT — ENCOUNTER SYMPTOMS
BLOOD IN STOOL: 0
COLOR CHANGE: 0
EYE ITCHING: 0
EYE PAIN: 0
APNEA: 0
CHEST TIGHTNESS: 0
PHOTOPHOBIA: 0
COUGH: 0
CONSTIPATION: 0
RECTAL PAIN: 0
DIARRHEA: 0
BACK PAIN: 0
SHORTNESS OF BREATH: 1
STRIDOR: 0
EYE REDNESS: 0
NAUSEA: 0
VOMITING: 0
ANAL BLEEDING: 0
ABDOMINAL DISTENTION: 0
CHOKING: 0
EYE DISCHARGE: 0
ABDOMINAL PAIN: 0
WHEEZING: 0

## 2021-02-20 NOTE — ED NOTES
Bed: E-45  Expected date:   Expected time:   Means of arrival:   Comments:  Once clean lobby SOB Afib      Janiya Hdez RN  02/19/21 2016

## 2021-02-20 NOTE — ED PROVIDER NOTES
[address] [facility]      Pt Name: Evangelist Belle  MRN: [id number]  [place] 1995  Date of evaluation: 2/19/2021  Provider: Arnie Yost MD    CHIEF COMPLAINT       Chief Complaint   Patient presents with    Shortness of Breath     acute onset SOB 1 hour ago. no CP.  shocked out of a-fib yesterday. HISTORY OF PRESENT ILLNESS    Evangelist Belle is a 26 y.o. male who presents to the emergency department with shortness of breath. Patient was just discharged 1 day prior after diagnosed with atrial fibrillation and cardioverted back into sinus rhythm. States he went home and developed some shortness of breath 1 hour prior to arrival.  That shortness of breath is resolved. Denies chest pain. No other associated symptoms. Similar symptoms to when he was admitted here. Denies fevers or chills. Waxing and waning symptoms. They did start spontaneously. Has not taken any medications other than the medications as prescribed. Nursing Notes were reviewed. Including nursing noted for FM, Surgical History, Past Medical History, Social History, vitals, and allergies; agree with all. REVIEW OF SYSTEMS       Review of Systems   Constitutional: Negative for activity change, appetite change, chills, diaphoresis, fatigue, fever and unexpected weight change. HENT: Negative for congestion, dental problem, drooling, ear discharge and ear pain. Eyes: Negative for photophobia, pain, discharge, redness, itching and visual disturbance. Respiratory: Positive for shortness of breath. Negative for apnea, cough, choking, chest tightness, wheezing and stridor. Cardiovascular: Negative for chest pain, palpitations and leg swelling. Gastrointestinal: Negative for abdominal distention, abdominal pain, anal bleeding, blood in stool, constipation, diarrhea, nausea, rectal pain and vomiting. Endocrine: Negative for cold intolerance and heat intolerance. phone: None     Gets together: None     Attends Orthodox service: None     Active member of club or organization: None     Attends meetings of clubs or organizations: None     Relationship status: None    Intimate partner violence     Fear of current or ex partner: None     Emotionally abused: None     Physically abused: None     Forced sexual activity: None   Other Topics Concern    None   Social History Narrative    None       PHYSICAL EXAM       ED Triage Vitals [02/19/21 2026]   BP Temp Temp Source Pulse Resp SpO2 Height Weight   (!) 151/99 98.3 °F (36.8 °C) Oral 97 18 100 % 6' 2\" (1.88 m) (!) 302 lb 7.5 oz (137.2 kg)       Physical Exam  Vitals signs and nursing note reviewed. Constitutional:       General: He is not in acute distress. Appearance: He is well-developed. He is not diaphoretic. HENT:      Head: Normocephalic and atraumatic. Eyes:      General:         Right eye: No discharge. Left eye: No discharge. Pupils: Pupils are equal, round, and reactive to light. Neck:      Musculoskeletal: Normal range of motion. Thyroid: No thyromegaly. Trachea: No tracheal deviation. Cardiovascular:      Rate and Rhythm: Normal rate and regular rhythm. Heart sounds: No murmur. Pulmonary:      Breath sounds: No wheezing or rales. Chest:      Chest wall: No tenderness. Abdominal:      General: There is no distension. Palpations: Abdomen is soft. There is no mass. Tenderness: There is no abdominal tenderness. There is no guarding or rebound. Musculoskeletal: Normal range of motion. General: No tenderness or deformity. Skin:     General: Skin is warm. Coloration: Skin is not pale. Findings: No erythema or rash. Neurological:      Mental Status: He is alert. Cranial Nerves: No cranial nerve deficit. Motor: No abnormal muscle tone.       Coordination: Coordination normal.         DIAGNOSTIC RESULTS     EKG: All EKG's are interpreted by the Emergency Department Physician who either signs or Co-signs this chart in the absence of acardiologist.    EKG shows normal sinus rhythm with sinus arrhythmia nonspecific EKG changes T wave inversions V2 through V6 similar to past EKGs    RADIOLOGY:   Non-plain film images such as CT, Ultrasoundand MRI are read by the radiologist. Plain radiographic images are visualized and preliminarily interpreted by the emergency physician with the below findings:    Impression   1. No evidence of pulmonary embolism or acute pulmonary abnormality   2.  Cardiomegaly, with left ventricular hypertrophy     ED BEDSIDE ULTRASOUND:   Performed by ED Physician - none    LABS:  Labs Reviewed   CBC WITH AUTO DIFFERENTIAL - Abnormal; Notable for the following components:       Result Value    Eosinophils Absolute 1.1 (*)     All other components within normal limits    Narrative:     Performed at:  [facility]  [address] [place] [address]   Phone [phone]   COMPREHENSIVE METABOLIC PANEL W/ REFLEX TO MG FOR LOW K - Abnormal; Notable for the following components:    Glucose 100 (*)     ALT 46 (*)     All other components within normal limits    Narrative:     Performed at:  [facility]  [address] [place] [address]   Phone [phone]   BRAIN NATRIURETIC PEPTIDE - Abnormal; Notable for the following components:    Pro- (*)     All other components within normal limits    Narrative:     Performed at:  [facility]  [address] [facility] [place] [address]   Phone [phone]   D-DIMER, QUANTITATIVE    Narrative:     Performed at:  [facility]  [address], [place] [employer] [address]   Phone [phone]   MAGNESIUM    Narrative:     Performed at:  [facility]  [address], [address] [facility] [place] [address]   Phone [phone] TROPONIN    Narrative:     Performed at:  [facility]  [address] [facility] [place] [address]   Phone [phone]       All other labs were withinnormal range or not returned as of this dictation. EMERGENCY DEPARTMENT COURSE and DIFFERENTIAL DIAGNOSIS/MDM:     PMH, Surgical Hx, FH, Social Hx reviewed by myself (ETOH usage, Tobacco usage, Drug usage reviewed by myself, no pertinent Hx)- No Pertinent Hx     Old records were reviewed by me    98% on room air. Symptoms resolved. Reassuring work-up. Discussed close follow-up with Dr. Lin Ann at which she has a ablation scheduled. I estimate there is LOW risk for Sepsis, MI, Stroke, Tamponade, PTX, Toxicity or other life threatening etiology thus I consider the discharge disposition reasonable. The patient is at low risk for mortality based on demographic, history and clinical factors. Given the best available information and clinical assessment, I estimate the risk of hospitalization to be greater than risk of treatment at home. I have explained to the patient that the risk could rapidly change, given precautions for return and instructions. Explained to patient that the risk for mortality is low based on demographic, history and clinical factors. I discussed with patient the results of evaluation in the ED, diagnosis, care, and prognosis. The plan is to discharge to home. Patient is in agreement with plan and questions have been answered. I also discussed with patient the reasons which may require a return visit and the importance of follow-up care. The patient is well-appearing, nontoxic, and improved at the time of discharge. Patient agrees to call to arrange follow-up care as directed. Patient understands to return immediately for worsening/change in symptoms. CRITICAL CARE TIME   Total Critical Caretime was 21 minutes, excluding separately reportable procedures.   There was a high probability of clinically significant/life threatening deterioration in the patient's condition which required my urgent intervention. PROCEDURES:  Unlessotherwise noted below, none    FINAL IMPRESSION      1.  Shortness of breath          DISPOSITION/PLAN   DISPOSITION Decision To Discharge 02/19/2021 10:59:52 PM    PATIENT REFERRED TO:  MD Kunal Jimenez Dr Madi Nav  [phone]    Schedule an appointment as soon as possible for a visit       [facility] Emergency Department  [address] [address]  [phone]  Go to   As needed      DISCHARGE MEDICATIONS:  Discharge Medication List as of 2/19/2021 11:19 PM             (Please note that portions ofthis note were completed with a voice recognition program.  Efforts were made to edit the dictations but occasionally words are mis-transcribed.)    Thony Nova MD(electronically signed)  Attending Emergency Physician           Thony Nova MD  02/20/21 3708

## 2021-02-20 NOTE — ED PROVIDER NOTES
[address] [facility]        Pt Name: Brannon Lorenzo  MRN: [id number]  [place] 1995  Date of evaluation: 2/19/2021  Provider: DEANGELO Jurado - LEW  PCP: Meena Rodriguez MD     I have seen and evaluated this patient with my supervising physician Thony Nova MD.    [facility]       Chief Complaint   Patient presents with    Shortness of Breath     acute onset SOB 1 hour ago. no CP.  shocked out of a-fib yesterday. HISTORY OF PRESENT ILLNESS   (Location, Timing/Onset, Context/Setting, Quality, Duration, Modifying Factors, Severity, Associated Signs and Symptoms)  Note limiting factors. Brannon Lorenzo is a 26 y.o. male who presents to the emergency department today complaining of acute onset shortness of breath. Onset was 1 hour prior to arrival.  The context was that started at rest.  No cough or fever. No chest pain or tightness. In brief history patient presented to ER 2 days ago was found to be in new onset A. fib and was symptomatic. He was admitted, and yesterday they cardioverted him out of A. fib. He advised he left the hospital asymptomatic until the shortness of breath started 1 hour ago. Nursing Notes were all reviewed and agreed with or any disagreements were addressed in the HPI. REVIEW OF SYSTEMS    (2-9 systems for level 4, 10 or more for level 5)     Review of Systems   Constitutional: Negative for chills, diaphoresis, fatigue and fever. HENT: Negative. Respiratory: Positive for shortness of breath. Negative for cough. Cardiovascular: Negative for chest pain, palpitations and leg swelling. Gastrointestinal: Negative for abdominal distention, abdominal pain, diarrhea, nausea and vomiting. Musculoskeletal: Negative for back pain, neck pain and neck stiffness. Skin: Negative for color change, pallor and rash. Allergic/Immunologic: Negative for immunocompromised state.    Neurological: Negative for dizziness, syncope and headaches. Hematological: Negative for adenopathy. Psychiatric/Behavioral: Negative for confusion. All other systems reviewed and are negative. Positives and Pertinent negatives as per HPI. Except as noted above in the ROS, all other systems were reviewed and negative. PAST MEDICAL HISTORY     Past Medical History:   Diagnosis Date    Atrial fibrillation [facility])          SURGICAL HISTORY   History reviewed. No pertinent surgical history. CURRENTMEDICATIONS       Previous Medications    FLECAINIDE (TAMBOCOR) 50 MG TABLET    Take 1 tablet by mouth every 12 hours    METOPROLOL SUCCINATE (TOPROL XL) 25 MG EXTENDED RELEASE TABLET    Take 0.5 tablets by mouth daily         ALLERGIES     Patient has no known allergies. FAMILYHISTORY     History reviewed. No pertinent family history. SOCIAL HISTORY       Social History     Tobacco Use    Smoking status: Never Smoker    Smokeless tobacco: Never Used   Substance Use Topics    Alcohol use: No    Drug use: No       SCREENINGS             PHYSICAL EXAM    (up to 7 for level 4, 8 or more for level 5)     ED Triage Vitals [02/19/21 2026]   BP Temp Temp Source Pulse Resp SpO2 Height Weight   (!) 151/99 98.3 °F (36.8 °C) Oral 97 18 100 % 6' 2\" (1.88 m) (!) 302 lb 7.5 oz (137.2 kg)       Physical Exam  Vitals signs and nursing note reviewed. Constitutional:       General: He is not in acute distress. Appearance: Normal appearance. He is well-developed. He is not toxic-appearing. HENT:      Head: Normocephalic and atraumatic. Eyes:      General: No scleral icterus. Conjunctiva/sclera: Conjunctivae normal.   Neck:      Musculoskeletal: Full passive range of motion without pain and neck supple. No neck rigidity. Vascular: No JVD. Cardiovascular:      Rate and Rhythm: Normal rate and regular rhythm. Heart sounds: Normal heart sounds.    Pulmonary:      Effort: Pulmonary effort is normal. No respiratory distress. Breath sounds: Normal breath sounds. Abdominal:      General: There is no distension. Palpations: Abdomen is soft. Abdomen is not rigid. Tenderness: There is no abdominal tenderness. Musculoskeletal: Normal range of motion. Right lower leg: He exhibits no tenderness and no swelling. Left lower leg: He exhibits no tenderness and no swelling. Skin:     General: Skin is warm and dry. Capillary Refill: Capillary refill takes less than 2 seconds. Findings: No rash. Neurological:      General: No focal deficit present. Mental Status: He is alert and oriented to person, place, and time. Cranial Nerves: Cranial nerves are intact.    Psychiatric:         Mood and Affect: Mood normal.         DIAGNOSTIC RESULTS   LABS:    Labs Reviewed   CBC WITH AUTO DIFFERENTIAL - Abnormal; Notable for the following components:       Result Value    Eosinophils Absolute 1.1 (*)     All other components within normal limits    Narrative:     Performed at:  [address] [address]   Phone [phone]   COMPREHENSIVE METABOLIC PANEL W/ REFLEX TO MG FOR LOW K - Abnormal; Notable for the following components:    Glucose 100 (*)     ALT 46 (*)     All other components within normal limits    Narrative:     Performed at:  [address] [address]   Phone [phone]   BRAIN NATRIURETIC PEPTIDE - Abnormal; Notable for the following components:    Pro- (*)     All other components within normal limits    Narrative:     Performed at:  [address] [address]   Phone [phone]   D-DIMER, QUANTITATIVE    Narrative:     Performed at:  [address] [address]   Phone [phone]   MAGNESIUM    Narrative: Performed at:  [address] [address]   Phone [phone]   TROPONIN       All other labs were within normal range or not returned as of this dictation. EKG: All EKG's are interpreted by the Emergency Department Physician in the absence of a cardiologist.  Please see their note for interpretation of EKG. RADIOLOGY:   Non-plain film images such as CT, Ultrasound and MRI are read by the radiologist. Plain radiographic images are visualized and preliminarily interpreted by the ED Provider with the below findings:        Interpretation per the Radiologist below, if available at the time of this note:    CT CHEST PULMONARY EMBOLISM W CONTRAST   Final Result   1. No evidence of pulmonary embolism or acute pulmonary abnormality   2. Cardiomegaly, with left ventricular hypertrophy         XR CHEST PORTABLE   Final Result   Unremarkable chest.           Echocardiogram Transesophageal    Result Date: 2/18/2021  Transesophageal Echocardiography Report (JOHN)  Demographics   Patient Name      Felix Townsend   Date of Study     02/18/2021          Gender             Male   Patient Number    [id number]          Date of Birth      1995   Visit Number      [id number]           Age                26 year(s)   Accession Number  [id number]          Room Number        3561   Corporate ID      [id number]            Sonographer   Ordering          Sunitha Roque MD  Interpreting       Sunitha Roque MD  Physician                             Physician   The procedure was explained in detail to the patient. Risks,  complications and alternative treatments were reviewed. Written consent  was obtained. Procedure Type of Study   JOHN procedure:ECHOCARDIOGRAM TRANSESOPHAGEAL. Procedure Date Date: 02/18/2021 Start: 12:03 PM Study Location: [address] [place] [address].  Height: 74 inches Weight: 296.01 pounds BSA: 2.57 m2 BMI: 38 kg/m2 Procedure Informed Consent  Procedure consent form obtained. Airway assessment performed. - See IV sedation record   Conclusions   Summary  There is no evidence of mass or thrombus in the left atrium or appendage. Signature   ------------------------------------------------------------------  Electronically signed by Rimma Gonzalez MD (Interpreting  physician) on 02/18/2021 at 12:56 PM  ------------------------------------------------------------------   Findings   Left Atrium  There is no evidence of mass or thrombus in the left atrium or appendage. Echo Complete    Result Date: 2/18/2021  Transthoracic Echocardiography Report (TTE)  Demographics   Patient Name       Hailee Corona   Date of Study      02/18/2021         Gender              Male   Patient Number     [id number]         Date of Birth       1995   Visit Number       [id number]          Age                 26 year(s)   Accession Number   [id number]         Room Number         6642   Corporate ID       [id number]           [place] [address], [place],                                                            [address] [facility]   Ordering Physician Yonatan Leigh, CNP           Physician           Shoshana Yeh MD  Procedure Type of Study   TTE procedure:ECHOCARDIOGRAM COMPLETE 2D W DOPPLER W COLOR, ECHO 2D  W/DOPPLER/COLOR/CONTRAST. Procedure Date Date: 02/18/2021 Start: 10:41 AM Study Location: [facility] - Echo Lab Technical Quality: Limited visualization due to poor acoustical window. Indications:Atrial fibrillation, Dyspnea/SOB and Palpitations. Patient Status: Routine Contrast Medium: Definity. Amount - 2 ml Height: 74 inches Weight: 296.01 pounds BSA: 2.57 m2 BMI: 38 kg/m2 Rhythm: Atrial fibrillation HR: 98 bpm BP: 159/87 mmHg  Conclusions   Summary  Mild conc. LVH with normal LV size .  [place] is poorly visualized and appears  to be akinetic in some views. EF 55%  Left atrium is of normal size. Normal right ventricular size and function. Trivial mitral, aortic and tricuspid regurgitation. The aortic root is dilated. Signature   ------------------------------------------------------------------  Electronically signed by Nathalia Avila MD (Interpreting  physician) on 02/18/2021 at 05:35 PM  ------------------------------------------------------------------   Findings   Left Ventricle  Left ventricular cavity size is normal.  There is mild concentric left ventricular hypertrophy. Ejection fraction is visually estimated to be 60-65%. No regional wall motion abnormalities are noted. Normal diastolic function. Mitral Valve  Mitral valve is structurally normal.  Trivial mitral regurgitation. No evidence of mitral stenosis. Left Atrium  Left atrium is of normal size. Aortic Valve  Trivial aortic regurgitation. No evidence of aortic valve stenosis. Aorta  The aortic root is dilated. The ascending aorta is normal in size. Right Ventricle  Normal right ventricular size and function. TAPSE= 2.1cm   Tricuspid Valve  Trivial tricuspid regurgitation. No evidence of tricuspid stenosis. Right Atrium  The right atrium is normal in size. Pulmonic Valve  Trivial pulmonic regurgitation present. No evidence of pulmonic valve stenosis. Pericardial Effusion  No pericardial effusion noted. Pleural Effusion  No pleural effusion. Miscellaneous  IVC not well visualized. Unable to estimate pulmonary artery pressure secondary to incomplete TR jet  envelope.   M-Mode/2D Measurements (cm)   LV Diastolic Dimension: 5.71 cm LV Systolic Dimension: 2.44 cm  LV Septum Diastolic: 4.89 cm  LV PW Diastolic: 3.85 cm        AO Root Dimension: 3.6 cm  RV Diastolic Dimension: 5.94 cm                                  LA Area: 22.4 cm2  LVOT: 2.3 cm                    LA volume/Index: 61 ml /24 ml/m2  Doppler Measurements AV Peak Velocity: 103 cm/s     MV Peak E-Wave: 93.8 cm/s  AV Peak Gradient: 4.24 mmHg  AV Mean Gradient: 3 mmHg       MV Mean Gradient: 1 mmHg  LVOT Peak Velocity: 100 cm/s   MV Max PG: 3 mmHg  AV Area (Continuity):3.91 cm2  MV Vmax:86.9 cm/s                                 MV VTI:15.5 cm/s                                  MV Area (continuity): 4.69 cm2  E' Septal Velocity: 11.3 cm/s  MV Deceleration Time: 100 msec  E' Lateral Velocity: 12.1 cm/s  PV Peak Velocity: 79.1 cm/s  PV Peak Gradient: 2.5 mmHg   Aortic Valve   Peak Velocity: 103 cm/s     Mean Velocity: 77.4 cm/s  Peak Gradient: 4.24 mmHg    Mean Gradient: 3 mmHg  Area (continuity): 3.91 cm2  AV VTI: 18.6 cm  Aorta   Aortic Root: 3.6 cm  Ascending Aorta: 2.9 cm  LVOT Diameter: 2.3 cm      Xr Chest (2 Vw)    Result Date: 2/17/2021  EXAMINATION: TWO XRAY VIEWS OF THE CHEST 2/17/2021 4:47 pm COMPARISON: June 13, 2016 HISTORY: ORDERING SYSTEM PROVIDED HISTORY: SOB TECHNOLOGIST PROVIDED HISTORY: Reason for exam:->SOB Reason for Exam: Dizziness Acuity: Acute Type of Exam: Initial FINDINGS: The lungs are without acute focal process. There is no effusion or pneumothorax. The cardiomediastinal silhouette is without acute process. The osseous structures are without acute process. No acute process. Ct Head Wo Contrast    Result Date: 2/17/2021  EXAMINATION: CT OF THE HEAD WITHOUT CONTRAST  2/17/2021 5:03 pm TECHNIQUE: CT of the head was performed without the administration of intravenous contrast. Dose modulation, iterative reconstruction, and/or weight based adjustment of the mA/kV was utilized to reduce the radiation dose to as low as reasonably achievable. COMPARISON: August 4, 2019 HISTORY: ORDERING SYSTEM PROVIDED HISTORY: dizziness TECHNOLOGIST PROVIDED HISTORY: Reason for exam:->dizziness Has a \"code stroke\" or \"stroke alert\" been called? ->No Decision Support Exception->Emergency Medical Condition (MA) Reason for Exam: dizziness Acuity: Acute Type of Exam: Initial FINDINGS: BRAIN/VENTRICLES: There is no acute intracranial hemorrhage, mass effect or midline shift. No abnormal extra-axial fluid collection. The gray-white differentiation is maintained without evidence of an acute infarct. There is no evidence of hydrocephalus. ORBITS: The visualized portion of the orbits demonstrate no acute abnormality. SINUSES: The visualized paranasal sinuses and mastoid air cells demonstrate no acute abnormality. SOFT TISSUES/SKULL:  No acute abnormality of the visualized skull or soft tissues. No acute intracranial abnormality. PROCEDURES   Unless otherwise noted below, none     Procedures    CRITICAL CARE TIME   N/A    CONSULTS:  None      EMERGENCY DEPARTMENT COURSE and DIFFERENTIAL DIAGNOSIS/MDM:   Vitals:    Vitals:    02/19/21 2145 02/19/21 2201 02/19/21 2217 02/19/21 2230   BP:  108/70  112/73   Pulse:  97 83 88   Resp: 14 14  14   Temp:       TempSrc:       SpO2:  97% 98% 97%   Weight:       Height:           Patient was given the following medications:  Medications   ipratropium-albuterol (DUONEB) nebulizer solution 1 ampule (has no administration in time range)   iopamidol (ISOVUE-370) 76 % injection 75 mL (75 mLs Intravenous Given 2/19/21 2158)           Differential Diagnosis: Acute Coronary Syndrome, Congestive Heart Failure, Myocardial Infarction, Pulmonary Embolus, Pneumonia, Pneumothorax, other    Patient seen and examined today for SOB. See HPI for patient presentation. Patient is hemodynamically stable, nontoxic, afebrile, and without tachycardia, tachypnea, and hypoxia. Physical exam as above. Well-appearing pleasant 80-year-old male lying in bed in no acute distress. Awake alert and oriented. No neurovascular deficits. Lungs CTA bilaterally cardiac exam is normal.  CBC and chemistry normal.  Troponin negative. D-dimer negative and CTA pulmonary shows no PE or acute pulmonary abnormality.   Patient resting comfortably on my reexam.  No suspicion for ACS or PE. No longer short of breath on my reexam.  Discharged home in stable condition. At this time, the evidence for any other entities in the differential is insufficient to justify any further testing. This was explained to the patient. The patient was advised that persistent or worsening symptoms will require further evaluation. I discussed with Beth Moreno and/or family the exam results, diagnosis, care, prognosis, reasons to return and the importance of follow up. Patient and/or family is in full agreement with plan and all questions have been answered. Specific discharge instructions explained, including reasons to return to the emergency department. Beth Moreno is well appearing, non-toxic, and afebrile at the time of discharge. Patient was instructed to follow up with primary care provider in 24-48 hours, and to instructed to return to ED immediately for any new or worsening concerns. Beth Moreno verbalized understanding and discharged home. The patient tolerated their visit well. They were seen and evaluated by the attending physician, Tracie Quintero MD who agreed with the assessment and plan. The patient and / or the family were informed of the results of any tests, a time was given to answer questions, a plan was proposed and they agreed with plan. FINAL IMPRESSION      1.  Shortness of breath          DISPOSITION/PLAN   DISPOSITION Decision To Discharge 02/19/2021 10:59:52 PM      PATIENT REFERREDTO:  MD Kunal Tang Dr  [phone]    Schedule an appointment as soon as possible for a visit       [facility] Emergency Department  [address] [address]  [phone]  Go to   As needed      DISCHARGE MEDICATIONS:  New Prescriptions    No medications on file       DISCONTINUED MEDICATIONS:  Discontinued Medications    No medications on file              (Please note that portions of this note were

## 2021-02-24 ENCOUNTER — TELEPHONE (OUTPATIENT)
Dept: CARDIOLOGY CLINIC | Age: 26
End: 2021-02-24

## 2021-02-24 ENCOUNTER — OFFICE VISIT (OUTPATIENT)
Dept: CARDIOLOGY CLINIC | Age: 26
End: 2021-02-24
Payer: COMMERCIAL

## 2021-02-24 VITALS
HEART RATE: 84 BPM | TEMPERATURE: 97.1 F | HEIGHT: 74 IN | WEIGHT: 297 LBS | SYSTOLIC BLOOD PRESSURE: 124 MMHG | BODY MASS INDEX: 38.12 KG/M2 | OXYGEN SATURATION: 96 % | DIASTOLIC BLOOD PRESSURE: 84 MMHG

## 2021-02-24 DIAGNOSIS — I48.91 ATRIAL FIBRILLATION, UNSPECIFIED TYPE (HCC): Primary | ICD-10-CM

## 2021-02-24 PROCEDURE — 1036F TOBACCO NON-USER: CPT | Performed by: INTERNAL MEDICINE

## 2021-02-24 PROCEDURE — 1111F DSCHRG MED/CURRENT MED MERGE: CPT | Performed by: INTERNAL MEDICINE

## 2021-02-24 PROCEDURE — 93000 ELECTROCARDIOGRAM COMPLETE: CPT | Performed by: INTERNAL MEDICINE

## 2021-02-24 PROCEDURE — G8484 FLU IMMUNIZE NO ADMIN: HCPCS | Performed by: INTERNAL MEDICINE

## 2021-02-24 PROCEDURE — G8427 DOCREV CUR MEDS BY ELIG CLIN: HCPCS | Performed by: INTERNAL MEDICINE

## 2021-02-24 PROCEDURE — G8417 CALC BMI ABV UP PARAM F/U: HCPCS | Performed by: INTERNAL MEDICINE

## 2021-02-24 PROCEDURE — 99215 OFFICE O/P EST HI 40 MIN: CPT | Performed by: INTERNAL MEDICINE

## 2021-02-24 NOTE — TELEPHONE ENCOUNTER
Afib ablation scheduled for 4/13/2021 at 7:30  Arrive at 6:30    Instructions provided in office. Reviewed the instructions and encouraged him to call with any questions.

## 2021-02-24 NOTE — TELEPHONE ENCOUNTER
Patient is scheduled for afib ablation on 4/13 and needs to be scheduled for cardiac CTA. Please call him with details. He is aware that you will be calling.

## 2021-02-24 NOTE — PATIENT INSTRUCTIONS
6. Please have a responsible adult to drive you home after procedure, you should go home same day, but there is always a possibility of an overnight stay. 7. Cath lab will provide you with all post procedure instructions  8. A 3 month follow up will be scheduled with Dr. Joseph Adorno Nurse practitioner Brayan Chisholm CNP post procedure    ________________________________________________________________________________________________  Gerson Hernandez will need to complete Pre-Procedure lab work prior to your completing your COVID test   The address for your lab work is:   [address] [place] [address]  Phone: [phone]  The hours are Mon -Fri.  7:00 am  5:00 pm     No appointment necessary    Please allow for at least 30 mins - 1 hours for your lab work to be completed.   _____________________________________________________________________________________________

## 2021-02-24 NOTE — PROGRESS NOTES
· ENT ROS: negative for - epistaxis, headaches, nasal discharge, sore throat   · Allergy and Immunology ROS: negative for - hives, nasal congestion   · Hematological and Lymphatic ROS: negative for - bleeding problems, blood clots, bruising or jaundice  · Endocrine ROS: negative for - skin changes, temperature intolerance or unexpected weight changes  · Respiratory ROS: negative for - cough, hemoptysis, pleuritic pain, SOB, sputum changes or wheezing  · Cardiovascular ROS: Per HPI. · Gastrointestinal ROS: negative for - abdominal pain, blood in stools, diarrhea, hematemesis, melena, nausea/vomiting or swallowing difficulty/pain  · Genito-Urinary ROS: negative for - dysuria or incontinence  · Musculoskeletal ROS: negative for - joint swelling or muscle pain  · Neurological ROS: negative for - confusion, dizziness, gait disturbance, headaches, numbness/tingling, seizures, speech problems, tremors, visual changes or weakness  · Dermatological ROS: negative for - rash    Physical Examination:  Vitals:    02/24/21 1116   BP: 124/84   Pulse: 84   Temp: 97.1 °F (36.2 °C)   SpO2: 96%       · Constitutional: Oriented. No distress. · Head: Normocephalic and atraumatic. · Mouth/Throat: Oropharynx is clear and moist.   · Eyes: Conjunctivae normal. EOM are normal.   · Neck: Normal range of motion. Neck supple. No rigidity. No JVD present. · Cardiovascular: Normal rate, regular rhythm, S1&S2 and intact distal pulses. · Pulmonary/Chest: Bilateral respiratory sounds. No wheezes. No rhonchi. · Abdominal: Soft. Bowel sounds present. No distension, No tenderness. · Musculoskeletal: No tenderness. No edema    · Lymphadenopathy: Has no cervical adenopathy. · Neurological: Alert and oriented. Cranial nerve appears intact, No Gross deficit   · Skin: Skin is warm and dry. No rash noted. · Psychiatric: Has a normal mood, affect and behavior     Labs:  Reviewed. ECG: reviewed, Sinus  rhythm with v-rate of  69 bpm with QRS duration 96 ms. No pathologic Q waves, ventricular pre-excitation, or QT prolongation. Studies:   1. Event monitor:       2. Echo:   ECHO 2/18/2021  Mild conc. LVH with normal LV size . [place] is poorly visualized and appears  to be akinetic in some views. EF 55%  Left atrium is of normal size. Normal right ventricular size and function. Trivial mitral, aortic and tricuspid regurgitation. The aortic root is dilated. 3. Stress Test:        4. Cath:     I independently reviewed the ECG, MCOT, echocardiogram, stress test, and coronary angiography/PCI results and used them for my plan of care. Procedures:  1. 2/18/2021 Successful external DC cardioversion     Assessment/Plan:   Atrial fibrillation  New onset 2/17/2021  CHADS VASC 0 anticoagulation not indicated at this time. S/p Successful external DC cardioversion   Continue flecainide 50 mg BID and Toprol XL 12.5 mg   Encouraged low sodium diet <2400 mg/day  Encouraged him to watch That sugar film    We educated the patient that atrial fibrillation is a worsening and progressive disease, with more frequent episodes that will ensue. Subsequent episodes usually become more sustained to the extent that many individuals would then develop persistent atrial fibrillation. Once persistence is reached, permanent atrial fibrillation is inevitable. We also discussed the fact that atrial fibrillation is associated with stroke, including life-threatening stroke, and therefore oral anticoagulation is warranted depending on the patient's [id number] score. I spent 40 minutes face to face with the patient, with greater than 50% of that time spent in counseling on the above. The patient opted to proceed with the atrial fibrillation ablation. We will schedule for a radiofrequency ablation with [employer] system with a JOHN procedure immediately prior to this ablation. A cardiac CTA will be ordered for pulmonary vein mapping prior to this procedure. We will order BMP, CBC, PT/INR, and Type & Screen prior to the procedure. Follow up in 3 months with [facility] NP if proceeds with ablation      Thank you for allowing me to participate in the care of Krissy Trevino. All questions and concerns were addressed to the patient/family. Alternatives to my treatment were discussed. This note was scribed in the presence of Dr Siomara Larson, by Isadora Valadez RN. The scribe's documentation has been prepared under my direction and personally reviewed by me in its entirety. I confirm that the note above accurately reflects all work, physical examination, the discussion of treatments and procedures, and medical decision making performed by me.     Siomara Larson MD, MS, [facility]  Cardiac Electrophysiology  [address]  [address] [facility], [address] [place] [address]  [address] [facility] [address]  [phone]

## 2021-02-25 NOTE — TELEPHONE ENCOUNTER
Called and notified pt of cardiac CTA scheduled on 3/5/21 with an arrival time of 10:45. Notified of preps as well. Pt v/u.

## 2021-03-15 DIAGNOSIS — I48.91 ATRIAL FIBRILLATION, UNSPECIFIED TYPE (HCC): Primary | ICD-10-CM

## 2021-03-15 DIAGNOSIS — I48.0 PAF (PAROXYSMAL ATRIAL FIBRILLATION) (HCC): ICD-10-CM

## 2021-03-15 NOTE — TELEPHONE ENCOUNTER
Spoke to pt girlfriend(ok per Hippanastacia) and she states they were moving and that's wy the pt missed the appt. She wanted scheduling number to schedule at their convenience. I gave her the number and let her know the pt should have this done within 1-2 weeks since ablation is on 4/13. She v/u.     **I also updated pt phone number in chart in case you need to reach him.

## 2021-03-15 NOTE — TELEPHONE ENCOUNTER
Patient cancelled his CTA for pulmonary vein mapping for his afib ablation that was scheduled on 3/5. Please contact patient and inquire about date and time that may work to complete the CTA and then schedule. Let me know when rescheduled.     Thanks,  Don Guallpa RN

## 2021-03-25 DIAGNOSIS — I48.91 ATRIAL FIBRILLATION, UNSPECIFIED TYPE (HCC): ICD-10-CM

## 2021-03-25 LAB
ABO/RH: NORMAL
ANION GAP SERPL CALCULATED.3IONS-SCNC: 12 MMOL/L (ref 3–16)
ANTIBODY SCREEN: NORMAL
BUN BLDV-MCNC: 14 MG/DL (ref 7–20)
CALCIUM SERPL-MCNC: 9.2 MG/DL (ref 8.3–10.6)
CHLORIDE BLD-SCNC: 106 MMOL/L (ref 99–110)
CO2: 25 MMOL/L (ref 21–32)
CREAT SERPL-MCNC: 0.8 MG/DL (ref 0.9–1.3)
GFR AFRICAN AMERICAN: >60
GFR NON-AFRICAN AMERICAN: >60
GLUCOSE BLD-MCNC: 85 MG/DL (ref 70–99)
HCT VFR BLD CALC: 47.4 % (ref 40.5–52.5)
HEMOGLOBIN: 16.4 G/DL (ref 13.5–17.5)
MCH RBC QN AUTO: 31.4 PG (ref 26–34)
MCHC RBC AUTO-ENTMCNC: 34.5 G/DL (ref 31–36)
MCV RBC AUTO: 91.1 FL (ref 80–100)
PDW BLD-RTO: 12.9 % (ref 12.4–15.4)
PLATELET # BLD: 270 K/UL (ref 135–450)
PMV BLD AUTO: 8.7 FL (ref 5–10.5)
POTASSIUM SERPL-SCNC: 4 MMOL/L (ref 3.5–5.1)
RBC # BLD: 5.21 M/UL (ref 4.2–5.9)
SODIUM BLD-SCNC: 143 MMOL/L (ref 136–145)
WBC # BLD: 7.2 K/UL (ref 4–11)

## 2021-03-30 ENCOUNTER — HOSPITAL ENCOUNTER (OUTPATIENT)
Dept: CT IMAGING | Age: 26
Discharge: HOME | End: 2021-03-30
Payer: COMMERCIAL

## 2021-03-30 DIAGNOSIS — I48.91 ATRIAL FIBRILLATION, UNSPECIFIED TYPE (HCC): ICD-10-CM

## 2021-03-30 PROCEDURE — 6360000004 HC RX CONTRAST MEDICATION: Performed by: INTERNAL MEDICINE

## 2021-03-30 PROCEDURE — 75574 CT ANGIO HRT W/3D IMAGE: CPT

## 2021-03-30 RX ADMIN — IOPAMIDOL 75 ML: 755 INJECTION, SOLUTION INTRAVENOUS at 09:06

## 2021-04-06 ENCOUNTER — TELEPHONE (OUTPATIENT)
Dept: CARDIOLOGY CLINIC | Age: 26
End: 2021-04-06

## 2021-04-06 NOTE — TELEPHONE ENCOUNTER
Called patient no answer. Left message with reminder of date and time of atrial fibrillation ablation 4/13/2021 with 630 am arrival and 730 procedure time and to call back with any queststions.

## 2021-04-12 RX ORDER — SODIUM CHLORIDE 9 MG/ML
INJECTION, SOLUTION INTRAVENOUS CONTINUOUS
Status: CANCELLED | OUTPATIENT
Start: 2021-04-13

## 2021-04-12 RX ORDER — SODIUM CHLORIDE 0.9 % (FLUSH) 0.9 %
5-40 SYRINGE (ML) INJECTION PRN
Status: CANCELLED | OUTPATIENT
Start: 2021-04-13

## 2021-04-12 RX ORDER — SODIUM CHLORIDE 9 MG/ML
25 INJECTION, SOLUTION INTRAVENOUS PRN
Status: CANCELLED | OUTPATIENT
Start: 2021-04-13

## 2021-04-12 RX ORDER — SODIUM CHLORIDE 0.9 % (FLUSH) 0.9 %
5-40 SYRINGE (ML) INJECTION EVERY 12 HOURS SCHEDULED
Status: CANCELLED | OUTPATIENT
Start: 2021-04-13

## 2021-04-13 ENCOUNTER — HOSPITAL ENCOUNTER (OUTPATIENT)
Dept: CARDIAC CATH/INVASIVE PROCEDURES | Age: 26
Discharge: HOME | End: 2021-04-13
Payer: COMMERCIAL

## 2021-04-15 NOTE — TELEPHONE ENCOUNTER
Patient no showed his atrial fibrillation ablation scheduled for 4/14/2021. I called patient at home number [phone] and it said welcome to [address] your call can not be completed as dialed. Called \"other number\" [phone]. The wireless customer you are trying to call is unavailable please try your call again later.

## 2021-04-25 ENCOUNTER — APPOINTMENT (OUTPATIENT)
Dept: GENERAL RADIOLOGY | Age: 26
End: 2021-04-25
Payer: COMMERCIAL

## 2021-04-25 ENCOUNTER — HOSPITAL ENCOUNTER (EMERGENCY)
Age: 26
Discharge: HOME | End: 2021-04-25
Payer: COMMERCIAL

## 2021-04-25 VITALS
OXYGEN SATURATION: 97 % | WEIGHT: 300 LBS | BODY MASS INDEX: 38.52 KG/M2 | HEART RATE: 92 BPM | SYSTOLIC BLOOD PRESSURE: 144 MMHG | RESPIRATION RATE: 16 BRPM | TEMPERATURE: 98 F | DIASTOLIC BLOOD PRESSURE: 88 MMHG

## 2021-04-25 DIAGNOSIS — S90.32XA CONTUSION OF LEFT FOOT, INITIAL ENCOUNTER: Primary | ICD-10-CM

## 2021-04-25 PROCEDURE — 73630 X-RAY EXAM OF FOOT: CPT

## 2021-04-25 PROCEDURE — 6370000000 HC RX 637 (ALT 250 FOR IP): Performed by: PHYSICIAN ASSISTANT

## 2021-04-25 PROCEDURE — 99283 EMERGENCY DEPT VISIT LOW MDM: CPT

## 2021-04-25 RX ORDER — HYDROCODONE BITARTRATE AND ACETAMINOPHEN 5; 325 MG/1; MG/1
1 TABLET ORAL EVERY 6 HOURS PRN
Qty: 7 TABLET | Refills: 0 | Status: SHIPPED | OUTPATIENT
Start: 2021-04-25 | End: 2021-04-28

## 2021-04-25 RX ORDER — OXYCODONE HYDROCHLORIDE AND ACETAMINOPHEN 5; 325 MG/1; MG/1
1 TABLET ORAL ONCE
Status: COMPLETED | OUTPATIENT
Start: 2021-04-25 | End: 2021-04-25

## 2021-04-25 RX ADMIN — OXYCODONE HYDROCHLORIDE AND ACETAMINOPHEN 1 TABLET: 5; 325 TABLET ORAL at 21:28

## 2021-04-25 ASSESSMENT — ENCOUNTER SYMPTOMS
ABDOMINAL PAIN: 0
COLOR CHANGE: 0
VOMITING: 0
STRIDOR: 0
BACK PAIN: 0
COUGH: 0
WHEEZING: 0
NAUSEA: 0
SHORTNESS OF BREATH: 0

## 2021-04-25 ASSESSMENT — PAIN DESCRIPTION - ORIENTATION: ORIENTATION: LEFT

## 2021-04-25 ASSESSMENT — PAIN SCALES - GENERAL: PAINLEVEL_OUTOF10: 10

## 2021-04-25 ASSESSMENT — PAIN DESCRIPTION - LOCATION: LOCATION: FOOT

## 2021-04-26 NOTE — ED PROVIDER NOTES
[address] [facility]        Pt Name: Reina Llamas  MRN: [id number]  [place] 1995  Date of evaluation: 4/25/2021  Provider: Miguel Casarez PA-C  PCP: Blossom Kemp MD    YULIYA. I have evaluated this patient. My supervising physician was available for consultation. CHIEF COMPLAINT       Chief Complaint   Patient presents with    Foot Injury     PT arrived from home with c/o got foot smashed in really big wooden door       HISTORY OF PRESENT ILLNESS   (Location, Timing/Onset, Context/Setting, Quality, Duration, Modifying Factors, Severity, Associated Signs and Symptoms)  Note limiting factors. Reina Llamas is a 26 y.o. male with history of obesity and atrial fibrillation who presents to the emergency department complaining of severe left foot pain status post blunt trauma which occurred this evening around 6 PM.  Patient states that a heavy wooden door opened and struck the left foot. This is not work-related. He was wearing slippers at the time. There is no associated open wound with this. He denies numbness, tingling or weakness but has increased pain when he attempts to bear weight. He rates his pain to be a 10 out of 10 on pain scale. (His cardiologist is Dr. Femi Dixon and he says that he was supposed to schedule an ablation with him but had to reschedule but denies chest pain or palpations)      Nursing Notes were all reviewed and agreed with or any disagreements were addressed in the HPI. REVIEW OF SYSTEMS    (2-9 systems for level 4, 10 or more for level 5)     Review of Systems   Constitutional: Negative for chills and fever. HENT: Negative. Eyes: Negative for visual disturbance. Respiratory: Negative for cough, shortness of breath, wheezing and stridor. Cardiovascular: Negative for chest pain, palpitations and leg swelling. Gastrointestinal: Negative for abdominal pain, nausea and vomiting.    Endocrine:

## 2021-08-17 ENCOUNTER — APPOINTMENT (OUTPATIENT)
Dept: GENERAL RADIOLOGY | Age: 26
DRG: 201 | End: 2021-08-17
Payer: COMMERCIAL

## 2021-08-17 ENCOUNTER — HOSPITAL ENCOUNTER (INPATIENT)
Age: 26
LOS: 1 days | Discharge: LEFT BEFORE DISCHARGE | DRG: 201 | End: 2021-08-17
Attending: STUDENT IN AN ORGANIZED HEALTH CARE EDUCATION/TRAINING PROGRAM | Admitting: INTERNAL MEDICINE
Payer: COMMERCIAL

## 2021-08-17 VITALS
DIASTOLIC BLOOD PRESSURE: 74 MMHG | SYSTOLIC BLOOD PRESSURE: 130 MMHG | HEART RATE: 89 BPM | RESPIRATION RATE: 16 BRPM | TEMPERATURE: 98.7 F | BODY MASS INDEX: 38.5 KG/M2 | HEIGHT: 74 IN | OXYGEN SATURATION: 96 % | WEIGHT: 300 LBS

## 2021-08-17 DIAGNOSIS — I48.91 ATRIAL FIBRILLATION WITH RVR (HCC): Primary | ICD-10-CM

## 2021-08-17 LAB
A/G RATIO: 1.6 (ref 1.1–2.2)
ALBUMIN SERPL-MCNC: 4.7 G/DL (ref 3.4–5)
ALP BLD-CCNC: 60 U/L (ref 40–129)
ALT SERPL-CCNC: 43 U/L (ref 10–40)
ANION GAP SERPL CALCULATED.3IONS-SCNC: 11 MMOL/L (ref 3–16)
AST SERPL-CCNC: 28 U/L (ref 15–37)
BASOPHILS ABSOLUTE: 0.1 K/UL (ref 0–0.2)
BASOPHILS RELATIVE PERCENT: 1 %
BILIRUB SERPL-MCNC: 0.4 MG/DL (ref 0–1)
BUN BLDV-MCNC: 11 MG/DL (ref 7–20)
CALCIUM SERPL-MCNC: 9.9 MG/DL (ref 8.3–10.6)
CHLORIDE BLD-SCNC: 105 MMOL/L (ref 99–110)
CO2: 23 MMOL/L (ref 21–32)
CREAT SERPL-MCNC: 0.8 MG/DL (ref 0.9–1.3)
EOSINOPHILS ABSOLUTE: 0.3 K/UL (ref 0–0.6)
EOSINOPHILS RELATIVE PERCENT: 3.2 %
GFR AFRICAN AMERICAN: >60
GFR NON-AFRICAN AMERICAN: >60
GLOBULIN: 2.9 G/DL
GLUCOSE BLD-MCNC: 102 MG/DL (ref 70–99)
HCT VFR BLD CALC: 49.8 % (ref 40.5–52.5)
HEMOGLOBIN: 17.4 G/DL (ref 13.5–17.5)
LYMPHOCYTES ABSOLUTE: 2.2 K/UL (ref 1–5.1)
LYMPHOCYTES RELATIVE PERCENT: 25.9 %
MAGNESIUM: 2 MG/DL (ref 1.8–2.4)
MCH RBC QN AUTO: 31.2 PG (ref 26–34)
MCHC RBC AUTO-ENTMCNC: 34.9 G/DL (ref 31–36)
MCV RBC AUTO: 89.4 FL (ref 80–100)
MONOCYTES ABSOLUTE: 0.8 K/UL (ref 0–1.3)
MONOCYTES RELATIVE PERCENT: 9.7 %
NEUTROPHILS ABSOLUTE: 5.1 K/UL (ref 1.7–7.7)
NEUTROPHILS RELATIVE PERCENT: 60.2 %
PDW BLD-RTO: 12.7 % (ref 12.4–15.4)
PLATELET # BLD: 285 K/UL (ref 135–450)
PMV BLD AUTO: 7.6 FL (ref 5–10.5)
POTASSIUM REFLEX MAGNESIUM: 3.9 MMOL/L (ref 3.5–5.1)
PRO-BNP: 236 PG/ML (ref 0–124)
RBC # BLD: 5.57 M/UL (ref 4.2–5.9)
SODIUM BLD-SCNC: 139 MMOL/L (ref 136–145)
TOTAL PROTEIN: 7.6 G/DL (ref 6.4–8.2)
TROPONIN: 0.36 NG/ML
TROPONIN: <0.01 NG/ML
WBC # BLD: 8.4 K/UL (ref 4–11)

## 2021-08-17 PROCEDURE — 83735 ASSAY OF MAGNESIUM: CPT

## 2021-08-17 PROCEDURE — 2580000003 HC RX 258: Performed by: STUDENT IN AN ORGANIZED HEALTH CARE EDUCATION/TRAINING PROGRAM

## 2021-08-17 PROCEDURE — 96365 THER/PROPH/DIAG IV INF INIT: CPT

## 2021-08-17 PROCEDURE — 84484 ASSAY OF TROPONIN QUANT: CPT

## 2021-08-17 PROCEDURE — 96374 THER/PROPH/DIAG INJ IV PUSH: CPT

## 2021-08-17 PROCEDURE — 99284 EMERGENCY DEPT VISIT MOD MDM: CPT

## 2021-08-17 PROCEDURE — 2500000003 HC RX 250 WO HCPCS: Performed by: STUDENT IN AN ORGANIZED HEALTH CARE EDUCATION/TRAINING PROGRAM

## 2021-08-17 PROCEDURE — 1200000000 HC SEMI PRIVATE

## 2021-08-17 PROCEDURE — 96366 THER/PROPH/DIAG IV INF ADDON: CPT

## 2021-08-17 PROCEDURE — 83880 ASSAY OF NATRIURETIC PEPTIDE: CPT

## 2021-08-17 PROCEDURE — 96361 HYDRATE IV INFUSION ADD-ON: CPT

## 2021-08-17 PROCEDURE — 84439 ASSAY OF FREE THYROXINE: CPT

## 2021-08-17 PROCEDURE — 6360000002 HC RX W HCPCS: Performed by: STUDENT IN AN ORGANIZED HEALTH CARE EDUCATION/TRAINING PROGRAM

## 2021-08-17 PROCEDURE — 85025 COMPLETE CBC W/AUTO DIFF WBC: CPT

## 2021-08-17 PROCEDURE — 80053 COMPREHEN METABOLIC PANEL: CPT

## 2021-08-17 PROCEDURE — 84443 ASSAY THYROID STIM HORMONE: CPT

## 2021-08-17 PROCEDURE — 2500000003 HC RX 250 WO HCPCS

## 2021-08-17 PROCEDURE — 96375 TX/PRO/DX INJ NEW DRUG ADDON: CPT

## 2021-08-17 PROCEDURE — 93005 ELECTROCARDIOGRAM TRACING: CPT | Performed by: STUDENT IN AN ORGANIZED HEALTH CARE EDUCATION/TRAINING PROGRAM

## 2021-08-17 PROCEDURE — 99283 EMERGENCY DEPT VISIT LOW MDM: CPT

## 2021-08-17 PROCEDURE — 36415 COLL VENOUS BLD VENIPUNCTURE: CPT

## 2021-08-17 PROCEDURE — 71045 X-RAY EXAM CHEST 1 VIEW: CPT

## 2021-08-17 RX ORDER — ONDANSETRON 4 MG/1
4 TABLET, ORALLY DISINTEGRATING ORAL EVERY 8 HOURS PRN
Status: DISCONTINUED | OUTPATIENT
Start: 2021-08-17 | End: 2021-08-17 | Stop reason: HOSPADM

## 2021-08-17 RX ORDER — SODIUM CHLORIDE 0.9 % (FLUSH) 0.9 %
5-40 SYRINGE (ML) INJECTION EVERY 12 HOURS SCHEDULED
Status: DISCONTINUED | OUTPATIENT
Start: 2021-08-17 | End: 2021-08-17 | Stop reason: HOSPADM

## 2021-08-17 RX ORDER — ACETAMINOPHEN 325 MG/1
650 TABLET ORAL EVERY 6 HOURS PRN
Status: DISCONTINUED | OUTPATIENT
Start: 2021-08-17 | End: 2021-08-17 | Stop reason: HOSPADM

## 2021-08-17 RX ORDER — SODIUM CHLORIDE 9 MG/ML
25 INJECTION, SOLUTION INTRAVENOUS PRN
Status: DISCONTINUED | OUTPATIENT
Start: 2021-08-17 | End: 2021-08-17 | Stop reason: HOSPADM

## 2021-08-17 RX ORDER — DILTIAZEM HCL/D5W 125 MG/125
5-15 PLASTIC BAG, INJECTION (ML) INTRAVENOUS CONTINUOUS
Status: DISCONTINUED | OUTPATIENT
Start: 2021-08-17 | End: 2021-08-17 | Stop reason: HOSPADM

## 2021-08-17 RX ORDER — ONDANSETRON 2 MG/ML
4 INJECTION INTRAMUSCULAR; INTRAVENOUS EVERY 6 HOURS PRN
Status: DISCONTINUED | OUTPATIENT
Start: 2021-08-17 | End: 2021-08-17 | Stop reason: HOSPADM

## 2021-08-17 RX ORDER — DIPHENHYDRAMINE HYDROCHLORIDE 50 MG/ML
25 INJECTION INTRAMUSCULAR; INTRAVENOUS ONCE
Status: COMPLETED | OUTPATIENT
Start: 2021-08-17 | End: 2021-08-17

## 2021-08-17 RX ORDER — ACETAMINOPHEN 650 MG/1
650 SUPPOSITORY RECTAL EVERY 6 HOURS PRN
Status: DISCONTINUED | OUTPATIENT
Start: 2021-08-17 | End: 2021-08-17 | Stop reason: HOSPADM

## 2021-08-17 RX ORDER — 0.9 % SODIUM CHLORIDE 0.9 %
1000 INTRAVENOUS SOLUTION INTRAVENOUS ONCE
Status: COMPLETED | OUTPATIENT
Start: 2021-08-17 | End: 2021-08-17

## 2021-08-17 RX ORDER — POLYETHYLENE GLYCOL 3350 17 G/17G
17 POWDER, FOR SOLUTION ORAL DAILY PRN
Status: DISCONTINUED | OUTPATIENT
Start: 2021-08-17 | End: 2021-08-17 | Stop reason: HOSPADM

## 2021-08-17 RX ORDER — FAMOTIDINE 20 MG/1
20 TABLET, FILM COATED ORAL 2 TIMES DAILY
Status: DISCONTINUED | OUTPATIENT
Start: 2021-08-17 | End: 2021-08-17 | Stop reason: HOSPADM

## 2021-08-17 RX ORDER — DILTIAZEM HYDROCHLORIDE 5 MG/ML
25 INJECTION INTRAVENOUS ONCE
Status: COMPLETED | OUTPATIENT
Start: 2021-08-17 | End: 2021-08-17

## 2021-08-17 RX ORDER — DILTIAZEM HYDROCHLORIDE 5 MG/ML
INJECTION INTRAVENOUS
Status: DISCONTINUED
Start: 2021-08-17 | End: 2021-08-17

## 2021-08-17 RX ORDER — SODIUM CHLORIDE 0.9 % (FLUSH) 0.9 %
5-40 SYRINGE (ML) INJECTION PRN
Status: DISCONTINUED | OUTPATIENT
Start: 2021-08-17 | End: 2021-08-17 | Stop reason: HOSPADM

## 2021-08-17 RX ADMIN — Medication 5 MG/HR: at 15:25

## 2021-08-17 RX ADMIN — SODIUM CHLORIDE 1000 ML: 9 INJECTION, SOLUTION INTRAVENOUS at 14:50

## 2021-08-17 RX ADMIN — DIPHENHYDRAMINE HYDROCHLORIDE 25 MG: 50 INJECTION, SOLUTION INTRAMUSCULAR; INTRAVENOUS at 14:31

## 2021-08-17 RX ADMIN — DILTIAZEM HYDROCHLORIDE 25 MG: 5 INJECTION INTRAVENOUS at 14:17

## 2021-08-17 ASSESSMENT — ENCOUNTER SYMPTOMS
CHEST TIGHTNESS: 1
SORE THROAT: 0
CONSTIPATION: 0
BLOOD IN STOOL: 0
ABDOMINAL PAIN: 0
BACK PAIN: 0
SHORTNESS OF BREATH: 1

## 2021-08-17 ASSESSMENT — PAIN SCALES - GENERAL: PAINLEVEL_OUTOF10: 5

## 2021-08-17 NOTE — ED PROVIDER NOTES
stool and constipation. Genitourinary: Negative for dysuria and frequency. Musculoskeletal: Negative for arthralgias and back pain. Skin: Negative for rash and wound. Neurological: Negative for dizziness, syncope and headaches. Psychiatric/Behavioral: Negative for confusion and decreased concentration. Except as noted above the remainder of the review of systems was reviewed and negative. PAST MEDICAL HISTORY     Past Medical History:   Diagnosis Date    Atrial fibrillation [facility])          SURGICAL HISTORY     History reviewed. No pertinent surgical history. CURRENT MEDICATIONS       Previous Medications    FLECAINIDE (TAMBOCOR) 50 MG TABLET    Take 1 tablet by mouth every 12 hours       ALLERGIES     Patient has no known allergies. FAMILY HISTORY     History reviewed. No pertinent family history. SOCIAL HISTORY       Social History     Socioeconomic History    Marital status: Single     Spouse name: None    Number of children: None    Years of education: None    Highest education level: None   Occupational History    None   Tobacco Use    Smoking status: Never Smoker    Smokeless tobacco: Never Used   Vaping Use    Vaping Use: Never used   Substance and Sexual Activity    Alcohol use: No    Drug use: No    Sexual activity: Yes     Partners: Female   Other Topics Concern    None   Social History Narrative    None     Social Determinants of Health     Financial Resource Strain:     Difficulty of Paying Living Expenses:    Food Insecurity:     Worried About Running Out of Food in the Last Year:     Ran Out of Food in the Last Year:    Transportation Needs:     Lack of Transportation (Medical):      Lack of Transportation (Non-Medical):    Physical Activity:     Days of Exercise per Week:     Minutes of Exercise per Session:    Stress:     Feeling of Stress :    Social Connections:     Frequency of Communication with Friends and Family:     Frequency of Social Gatherings with Friends and Family:     Attends Yazidi Services:     Active Member of Clubs or Organizations:     Attends Club or Organization Meetings:     Marital Status:    Intimate Partner Violence:     Fear of Current or Ex-Partner:     Emotionally Abused:     Physically Abused:     Sexually Abused:        SCREENINGS                        PHYSICAL EXAM    (up to 7 for level 4, 8 or more for level 5)     ED Triage Vitals [08/17/21 1405]   BP Temp Temp Source Pulse Resp SpO2 Height Weight   132/88 98.7 °F (37.1 °C) Oral 117 14 97 % 6' 2\" (1.88 m) 300 lb (136.1 kg)       Physical Exam  Constitutional:       Appearance: Normal appearance. He is diaphoretic. HENT:      Head: Normocephalic and atraumatic. Eyes:      General:         Right eye: No discharge. Left eye: No discharge. Conjunctiva/sclera: Conjunctivae normal.   Cardiovascular:      Rate and Rhythm: Tachycardia present. Rhythm irregular. Heart sounds: Normal heart sounds. No murmur heard. Pulmonary:      Effort: Pulmonary effort is normal. No respiratory distress. Breath sounds: Normal breath sounds. Abdominal:      General: Abdomen is flat. Bowel sounds are normal.      Palpations: Abdomen is soft. Tenderness: There is no abdominal tenderness. Musculoskeletal:         General: No swelling or tenderness. Right lower leg: No edema. Left lower leg: No edema. Skin:     General: Skin is warm. Capillary Refill: Capillary refill takes less than 2 seconds. Neurological:      Mental Status: He is alert and oriented to person, place, and time. Psychiatric:         Mood and Affect: Mood normal.         Behavior: Behavior normal.         DIAGNOSTIC RESULTS     EKG: All EKG's are interpreted by the Emergency Department Physician who either signs or Co-signs this chart in the absence of a cardiologist.    The Ekg interpreted by me in the absence of a cardiologist shows.   Atrial fibrillation with RVR, ventricular rate of 130 bpm.  Axis is normal.  QTc is normal.  There are deep T wave inversions in the anterior lateral leads not significantly changed from prior 2-24-21. A. fib with RVR has replaced normal sinus rhythm from 2-24-21      RADIOLOGY:   Non-plain film images such as CT, Ultrasound and MRI are read by the radiologist. Plain radiographic images are visualized and preliminarily interpreted by the emergency physician with the below findings:    Interpretation per the Radiologist below, if available at the time of this note:    XR CHEST PORTABLE   Final Result   No acute cardiopulmonary process. LABS:  Labs Reviewed   COMPREHENSIVE METABOLIC PANEL W/ REFLEX TO MG FOR LOW K - Abnormal; Notable for the following components:       Result Value    Glucose 102 (*)     CREATININE 0.8 (*)     ALT 43 (*)     All other components within normal limits    Narrative:     Performed at:  [facility]  [employer]   Phone [phone]   BRAIN NATRIURETIC PEPTIDE - Abnormal; Notable for the following components:    Pro- (*)     All other components within normal limits    Narrative:     Performed at:  [facility]  [facility] [facility]   Phone [phone]   CBC WITH AUTO DIFFERENTIAL    Narrative:     Performed at:  [facility]  [employer] [facility]   Phone [phone]   TROPONIN    Narrative:     Performed at:  [facility]  [facility] [facility]   Phone [phone]   MAGNESIUM    Narrative:     Performed at:  [facility]  [facility] [facility]   Phone [phone]       All other labs were within normal range or not returned as of this dictation.     EMERGENCY DEPARTMENT COURSE and DIFFERENTIAL DIAGNOSIS/MDM:   Vitals:    Vitals: 08/17/21 1405   BP: 132/88   Pulse: 117   Resp: 14   Temp: 98.7 °F (37.1 °C)   TempSrc: Oral   SpO2: 97%   Weight: 300 lb (136.1 kg)   Height: 6' 2\" (1.88 m)       Medications   dilTIAZem injection 25 mg (25 mg Intravenous Given 8/17/21 1417)     Followed by   dilTIAZem (CARDIZEM) 125 mg in dextrose 5% 125 mL infusion (premix) (has no administration in time range)   0.9 % sodium chloride IV bolus 1,000 mL (1,000 mLs Intravenous New Bag 8/17/21 1450)   diphenhydrAMINE (BENADRYL) injection 25 mg (25 mg Intravenous Given 8/17/21 1431)     [id number] Score for Atrial Fibrillation Stroke Risk   Risk   Factors  Component Value   C CHF No 0   H HTN No 0   A2 Age >= 75 No,  (23 y.o.) 0   D DM No 0   S2 Prior Stroke/TIA No 0   V Vascular Disease No 0   A Age 74-69 No,  (23 y.o.) 0   Sc Sex male 0    [id number]  Score  0   Score last updated 8/17/21 2:16 PM EDT    Click here for a link to the UpToDate guideline \"Atrial Fibrillation: Anticoagulation therapy to prevent embolization    Disclaimer: Risk Score calculation is dependent on accuracy of patient problem list and past encounter diagnosis. Patient is 31-year-old male presenting to the emergency room in [facility] with RVR. Symptoms of shortness of breath and chest pressure started about 45 minutes ago at rest.  No known triggering factors. On arrival he is tachycardic up to the 200s when he stands up. Blood pressure has remained stable. EMS gave 12 and 6mg of adenosine with no significant improvement as expected. Last EF in [facility] was reviewed and normal.  Diltiazem bolus initiated. Heart rate has improved to the 110s but creases when the patient stands. IV fluids also given for possible orthostasis. EKG showing deep anterior lateral T wave inversions not changed from prior. Initial troponin is negative. Patient is requiring a diltiazem drip to maintain heart rate in the 110s. Blood pressure remained stable.   He is not compliant with the flecainide and metoprolol that was prescribed in February. I do believe he warrants admission for control of RVR, cardiology consult to discuss definitive measures such as ablation. Is agreeable to admission today. CRITICAL CARE TIME   Total Critical Care time was 35 minutes, excluding separately reportable procedures. There was a high probability of clinically significant/life threatening deterioration in the patient's condition which required my urgent intervention. PROCEDURES:  Unless otherwise noted below, none     Procedures      FINAL IMPRESSION      1. Atrial fibrillation with RVR (HCC)          DISPOSITION/PLAN   DISPOSITION        PATIENT REFERRED TO:  No follow-up provider specified. DISCHARGE MEDICATIONS:      New Prescriptions    No medications on file       Controlled Substances Monitoring:    If the patient was prescribed a controlled substance today, the PDMP was reviewed as documented below. No flowsheet data found.     (Please note that portions of this note were completed with a voice recognition program.  Efforts were made to edit the dictations but occasionally words are mis-transcribed.)    Perla Vega MD (electronically signed)  Attending Emergency Physician         Esperanza Du MD  08/17/21 3800       Esperanza Du MD  08/17/21 7469

## 2021-08-17 NOTE — ED NOTES
Bed: 01  Expected date:   Expected time:   Means of arrival:   Comments:  Critical Only     Adán Van RN  08/17/21 7808

## 2021-08-17 NOTE — H&P
Hematologic/Lymphatic: Negative for bleeding tendency, easy bruising  Musculoskeletal: Negative for myalgias and arthralgias  Neurologic: Negative for confusion,dysarthria. Skin: Negative for itching,rash  Psychiatric: Negative for depression,anxiety, agitation. Endocrine: Negative for polydipsia,polyuria,heat /cold intolerance. Past Medical History:   has a past medical history of Atrial fibrillation ([address] Past Surgical History:   has no past surgical history on file. Medications:  No current facility-administered medications on file prior to encounter. Current Outpatient Medications on File Prior to Encounter   Medication Sig Dispense Refill    flecainide (TAMBOCOR) 50 MG tablet Take 1 tablet by mouth every 12 hours 60 tablet 0       Allergies:  No Known Allergies     Social History:  Patient Lives at home. reports that he has never smoked. He has never used smokeless tobacco. He reports that he does not drink alcohol and does not use drugs. Family History:  family history is not on file. Physical Exam:  BP (!) 145/96   Pulse 96   Temp 98.7 °F (37.1 °C) (Oral)   Resp 12   Ht 6' 2\" (1.88 m)   Wt 300 lb (136.1 kg)   SpO2 92%   BMI 38.52 kg/m²     General appearance:  Appears comfortable. Well nourished  Eyes: Sclera clear, pupils equal  ENT: Moist mucus membranes, no thrush. Trachea midline. Cardiovascular: Regular rhythm, normal S1, S2. No murmur, gallop, rub. No edema in lower extremities  Respiratory: Clear to auscultation bilaterally, no wheeze, good inspiratory effort  Gastrointestinal: Abdomen soft, non-tender, not distended, normal bowel sounds  Musculoskeletal: No cyanosis in digits, neck supple  Neurology: Cranial nerves grossly intact. Alert and oriented in time, place and person. No speech or motor deficits  Psychiatry: Appropriate affect.  Not agitated  Skin: Warm, dry, normal turgor, no rash  Brisk capillary refill, peripheral pulses palpable   Labs:  CBC:   Lab Results   Component Value Date    WBC 8.4 08/17/2021    RBC 5.57 08/17/2021    HGB 17.4 08/17/2021    HCT 49.8 08/17/2021    MCV 89.4 08/17/2021    MCH 31.2 08/17/2021    MCHC 34.9 08/17/2021    RDW 12.7 08/17/2021     08/17/2021    MPV 7.6 08/17/2021     BMP:    Lab Results   Component Value Date     08/17/2021    K 3.9 08/17/2021     08/17/2021    CO2 23 08/17/2021    BUN 11 08/17/2021    CREATININE 0.8 08/17/2021    CALCIUM 9.9 08/17/2021    GFRAA >60 08/17/2021    LABGLOM >60 08/17/2021    GLUCOSE 102 08/17/2021     XR CHEST PORTABLE   Final Result   No acute cardiopulmonary process. I visualized CXR images and EKG strips    Discussed case  with patient, pharmacist and ED provider. Problem List  Active Problems:    * No active hospital problems. *  Resolved Problems:    * No resolved hospital problems. *        Assessment/Plan:     Atrial fibrillation with RVR  Patient underwent cardioversion in 2/2021. Followed up with Dr. Chantelle Chau from cardiology as outpatient. Recommendation was for patient to take flecainide 50 mg p.o. twice daily and Toprol-XL 12.5 mg. Patient however has not taken any of his medications for over a month. Currently on Cardizem drip. We will continue. Resumed Toprol-XL. Holding off flecainide for now. TSH, free T3-T4 levels ordered. EP cardiology consulted. Chadvas 2 score of 0. Not a candidate for anticoagulation. Dyspnea  Patient reports similar episodes when he was is in A. fib with RVR. Not hypoxic. Maintaining O2 sats on room air. Patient likely has RENY. Will need outpatient sleep study. Morbid obesity  Low carb diet, exercise and lifestyle changes. DVT prophylaxis Lovenox subcu daily  Code status full code  Diet low-salt diet  IV access peripheral IV  Briseno Catheter no    Admit as inpatient.  I anticipate hospitalization spanning more than two midnights for investigation and treatment of the above medically necessary diagnoses. Please note that some part of this chart was generated using Dragon dictation software. Although every effort was made to ensure the accuracy of this automated transcription, some errors in transcription may have occurred inadvertently. If you may need any clarification, please do not hesitate to contact me through [facility].        Andreia Sykes MD    8/17/2021 4:20 PM

## 2021-08-17 NOTE — Clinical Note
Patient Class: Inpatient [101]   REQUIRED: Diagnosis: Atrial fibrillation with rapid ventricular response ([facility] [address] [124159]   Estimated Length of Stay: Estimated stay of more than 2 midnights   Admitting Provider: Bernie Aggarwal [[id number]]   Telemetry/Cardiac Monitoring Required?: Yes

## 2021-08-17 NOTE — PROGRESS NOTES
Pt seen in  ED, admission completed. Pt is alert and oriented x 4. Pt lives at home with his significant other, and is being admitted for A- fib RVR. Plan of care updated, all questions answered.

## 2021-08-18 LAB
EKG ATRIAL RATE: 375 BPM
EKG DIAGNOSIS: NORMAL
EKG Q-T INTERVAL: 412 MS
EKG QRS DURATION: 102 MS
EKG QTC CALCULATION (BAZETT): 478 MS
EKG R AXIS: 32 DEGREES
EKG T AXIS: 178 DEGREES
EKG VENTRICULAR RATE: 81 BPM
T4 FREE: 1.2 NG/DL (ref 0.9–1.8)
TSH REFLEX: 1.44 UIU/ML (ref 0.27–4.2)

## 2021-08-18 PROCEDURE — 93010 ELECTROCARDIOGRAM REPORT: CPT | Performed by: INTERNAL MEDICINE

## 2021-08-18 NOTE — DISCHARGE SUMMARY
The patient has decided to leave against medical advice from ED, because he wanted to go to [facility] instead. She has normal mental status and adequate capacity to make medical decisions. The patient refuses hospital admission and wants to be discharged. The risks have been explained to the patient, including worsening illness, chronic pain, permanent disability, and death. The benefits of admission have also been explained, including the availability and proximity of nurses, physicians, monitoring, diagnostic testing, and treatment. The patient was able to understand and state the risks and benefits of hospital admission. This was witnessed by nurse Lory Chung and night MD Dr. Redd Munguia. She had the opportunity to ask questions about her medical condition. The patient was treated to the extent that she would allow, and knows that she may return for care at any time. Instructed patient to follow up with Eulalia Gonsales MD.    Brief course in ED:   Luiza Pagan is a 23 y.o. obese  male with history of COVID-19 viral pneumonia, atrial fibrillation who presented to ED via with complaints of shortness of breath. When EMS arrived at his home, patient was noted to be in atrial fibrillation with RVR, heart rate up to 240s per EMS. Received adenosine 6 mg and 12 mg by EMS in route to ED. In ER, patient received a bolus of Cardizem and was started on Cardizem drip. During my visit, patient's heart rate was in 90-100s on Cardizem drip. Continues to report occasional dyspnea. Maintaining O2 sats above 92% on room air. Patient was sent diagnosed of atrial fibrillation and underwent  external DC cardioversion in 2/2021. Follows up with Dr. Angeli Palomino from cardiology. Patient takes flecainide 50 mg p.o. twice daily and Toprol-XL 12.5 mg daily as outpatient. Patient however reports that he has not taken any of his medications for over a month. Admission orders were placed.   ALFREDO cardiology consulted. Patient however decided to leave AMA from ED and go to bed to [facility] instead.     Dr. Shon Cobb

## 2021-08-18 NOTE — ED NOTES
Pt left AMA, stating \"he does not want to stay at this hospital, he would rather go to [facility]. \" This RN encouraged pt to stay, and told him he has a room on [address]. Pt refused to stay and signed AMA papers.       Trae Raza RN  08/17/21 4716

## 2024-04-28 ENCOUNTER — HOSPITAL ENCOUNTER (EMERGENCY)
Age: 29
Discharge: HOME | End: 2024-04-28
Payer: COMMERCIAL

## 2024-04-28 ENCOUNTER — APPOINTMENT (OUTPATIENT)
Dept: GENERAL RADIOLOGY | Age: 29
End: 2024-04-28
Payer: COMMERCIAL

## 2024-04-28 VITALS
TEMPERATURE: 98.3 F | RESPIRATION RATE: 16 BRPM | DIASTOLIC BLOOD PRESSURE: 90 MMHG | BODY MASS INDEX: 37.06 KG/M2 | HEIGHT: 74 IN | SYSTOLIC BLOOD PRESSURE: 149 MMHG | OXYGEN SATURATION: 98 % | WEIGHT: 288.8 LBS | HEART RATE: 80 BPM

## 2024-04-28 DIAGNOSIS — R03.0 ELEVATED BLOOD PRESSURE READING: ICD-10-CM

## 2024-04-28 DIAGNOSIS — M25.512 ACUTE PAIN OF LEFT SHOULDER: Primary | ICD-10-CM

## 2024-04-28 PROCEDURE — 6370000000 HC RX 637 (ALT 250 FOR IP): Performed by: PHYSICIAN ASSISTANT

## 2024-04-28 PROCEDURE — 99283 EMERGENCY DEPT VISIT LOW MDM: CPT

## 2024-04-28 PROCEDURE — 73030 X-RAY EXAM OF SHOULDER: CPT

## 2024-04-28 RX ORDER — ACETAMINOPHEN 325 MG/1
650 TABLET ORAL EVERY 6 HOURS PRN
Qty: 30 TABLET | Refills: 0 | Status: SHIPPED | OUTPATIENT
Start: 2024-04-28

## 2024-04-28 RX ORDER — IBUPROFEN 400 MG/1
800 TABLET ORAL ONCE
Status: COMPLETED | OUTPATIENT
Start: 2024-04-28 | End: 2024-04-28

## 2024-04-28 RX ORDER — IBUPROFEN 800 MG/1
800 TABLET ORAL EVERY 8 HOURS PRN
Qty: 20 TABLET | Refills: 0 | Status: SHIPPED | OUTPATIENT
Start: 2024-04-28

## 2024-04-28 RX ORDER — HYDROCODONE BITARTRATE AND ACETAMINOPHEN 5; 325 MG/1; MG/1
1 TABLET ORAL ONCE
Status: COMPLETED | OUTPATIENT
Start: 2024-04-28 | End: 2024-04-28

## 2024-04-28 RX ADMIN — IBUPROFEN 800 MG: 400 TABLET, FILM COATED ORAL at 14:57

## 2024-04-28 RX ADMIN — HYDROCODONE BITARTRATE AND ACETAMINOPHEN 1 TABLET: 5; 325 TABLET ORAL at 14:57

## 2024-04-28 ASSESSMENT — PAIN - FUNCTIONAL ASSESSMENT
PAIN_FUNCTIONAL_ASSESSMENT: PREVENTS OR INTERFERES SOME ACTIVE ACTIVITIES AND ADLS
PAIN_FUNCTIONAL_ASSESSMENT: 0-10

## 2024-04-28 ASSESSMENT — PAIN DESCRIPTION - ORIENTATION
ORIENTATION: LEFT
ORIENTATION: LEFT

## 2024-04-28 ASSESSMENT — PAIN DESCRIPTION - LOCATION
LOCATION: SHOULDER

## 2024-04-28 ASSESSMENT — PAIN SCALES - GENERAL
PAINLEVEL_OUTOF10: 5
PAINLEVEL_OUTOF10: 7
PAINLEVEL_OUTOF10: 6

## 2024-04-28 ASSESSMENT — PAIN DESCRIPTION - DESCRIPTORS
DESCRIPTORS: ACHING
DESCRIPTORS: ACHING;SHARP

## 2024-04-28 ASSESSMENT — PAIN DESCRIPTION - FREQUENCY: FREQUENCY: CONTINUOUS

## 2024-04-28 ASSESSMENT — PAIN DESCRIPTION - PAIN TYPE: TYPE: ACUTE PAIN
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
